# Patient Record
Sex: FEMALE | Race: BLACK OR AFRICAN AMERICAN | NOT HISPANIC OR LATINO | URBAN - METROPOLITAN AREA
[De-identification: names, ages, dates, MRNs, and addresses within clinical notes are randomized per-mention and may not be internally consistent; named-entity substitution may affect disease eponyms.]

---
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
## 2020-03-04 NOTE — ED PROVIDER NOTE - CARDIAC, MLM
NOTIFICATION OF RETURN TO WORK / SCHOOL 
 
3/4/2020 Mr. Lisandro Hale [place] [id number] [facility] [address] To Whom It May Concern: 
 
Kate Marrufo was under the care of [address] [place] [address] [facility]  from 02/21/2020 to 03/04/2020. He will return to work on 03/09/2020 with restrictions of not lifting more than 10 lbs until April 6, 2020 If there are questions or concerns please have the patient contact our office. Sincerely, Sherly Rivera NP 

Normal rate, regular rhythm.  Heart sounds S1, S2.

## 2020-03-18 ENCOUNTER — APPOINTMENT (OUTPATIENT)
Dept: PEDIATRIC ADOLESCENT MEDICINE | Facility: CLINIC | Age: 20
End: 2020-03-18
Payer: SELF-PAY

## 2020-03-18 PROCEDURE — 86580 TB INTRADERMAL TEST: CPT

## 2020-03-18 NOTE — HISTORY OF PRESENT ILLNESS
[PPD] : PPD [FreeTextEntry1] : Pt requesting for PPD plant for  employment.\par Forms completed, in Anastasiya ELAINE desk for reading\par \par PPD plant on right forearm\par FU in 48-72hrs for reading

## 2020-03-20 ENCOUNTER — APPOINTMENT (OUTPATIENT)
Dept: PEDIATRIC ADOLESCENT MEDICINE | Facility: CLINIC | Age: 20
End: 2020-03-20

## 2020-05-05 ENCOUNTER — APPOINTMENT (OUTPATIENT)
Dept: PEDIATRIC ADOLESCENT MEDICINE | Facility: HOSPITAL | Age: 20
End: 2020-05-05
Payer: SELF-PAY

## 2020-05-05 VITALS — HEART RATE: 112 BPM | DIASTOLIC BLOOD PRESSURE: 73 MMHG | WEIGHT: 168 LBS | SYSTOLIC BLOOD PRESSURE: 137 MMHG

## 2020-05-05 PROCEDURE — 99214 OFFICE O/P EST MOD 30 MIN: CPT

## 2020-05-07 LAB
C TRACH RRNA SPEC QL NAA+PROBE: NOT DETECTED
N GONORRHOEA RRNA SPEC QL NAA+PROBE: NOT DETECTED
SOURCE AMPLIFICATION: NORMAL

## 2020-05-07 NOTE — HISTORY OF PRESENT ILLNESS
[FreeTextEntry6] : 18 y/o F with no sig pmh here for vaginal discharge that started 3 days ago.  Patient says she also has itchiness.  She denies fevers or dysuria.  She has no sick contacts and has has been feeling otherwise well.  She was treated for yeast vaginitis in December and she says this feels the same.  She was last sexually active 5/3/20, used "pull out method", she recently became sexually active with first partner.  They have had sex 6 times and use condoms sometimes she says.  LMP 4/12/20.

## 2020-07-13 ENCOUNTER — APPOINTMENT (OUTPATIENT)
Dept: PEDIATRIC ADOLESCENT MEDICINE | Facility: HOSPITAL | Age: 20
End: 2020-07-13
Payer: SELF-PAY

## 2020-07-13 ENCOUNTER — OUTPATIENT (OUTPATIENT)
Dept: OUTPATIENT SERVICES | Age: 20
LOS: 1 days | End: 2020-07-13

## 2020-07-13 VITALS — SYSTOLIC BLOOD PRESSURE: 131 MMHG | WEIGHT: 170 LBS | DIASTOLIC BLOOD PRESSURE: 64 MMHG | HEART RATE: 100 BPM

## 2020-07-13 DIAGNOSIS — N89.8 OTHER SPECIFIED NONINFLAMMATORY DISORDERS OF VAGINA: ICD-10-CM

## 2020-07-13 PROCEDURE — 99213 OFFICE O/P EST LOW 20 MIN: CPT

## 2020-07-13 RX ORDER — KETOCONAZOLE 20.5 MG/ML
2 SHAMPOO, SUSPENSION TOPICAL
Qty: 1 | Refills: 1 | Status: DISCONTINUED | COMMUNITY
Start: 2017-10-06 | End: 2020-07-13

## 2020-07-13 RX ORDER — CHLORHEXIDINE GLUCONATE 4 %
325 (65 FE) LIQUID (ML) TOPICAL TWICE DAILY
Qty: 60 | Refills: 5 | Status: DISCONTINUED | COMMUNITY
Start: 2017-11-09 | End: 2020-07-13

## 2020-07-13 RX ORDER — NYSTATIN 100000 U/G
100000 OINTMENT TOPICAL
Qty: 1 | Refills: 3 | Status: DISCONTINUED | COMMUNITY
Start: 2019-12-10 | End: 2020-07-13

## 2020-07-13 RX ORDER — FLUCONAZOLE 150 MG/1
150 TABLET ORAL
Qty: 1 | Refills: 0 | Status: COMPLETED | COMMUNITY
Start: 2020-07-13 | End: 2020-07-14

## 2020-07-13 RX ORDER — FLUCONAZOLE 150 MG/1
150 TABLET ORAL
Qty: 1 | Refills: 0 | Status: DISCONTINUED | COMMUNITY
Start: 2019-12-10 | End: 2020-07-13

## 2020-07-13 NOTE — REVIEW OF SYSTEMS
[Vaginal Dischage] : vaginal discharge [Vaginal Itch] : vaginal itch [Negative] : Skin [Dysuria] : no dysuria [Polyuria] : no polyuria [Hematuria] : no hematuria

## 2020-07-13 NOTE — DISCUSSION/SUMMARY
[FreeTextEntry1] : 18yo female tolerated vaginal swab for diagnosis of likely candida infection\par BD Affirm Vaginosis panel obtained but patient requesting treatment prior to obtaining results\par Fluconazole 150mg x 1\par GC/CT vaginal swab obtained

## 2020-07-13 NOTE — RISK ASSESSMENT
[Has had sexual intercourse] : has had sexual intercourse [Vaginal] : vaginal [Uses tobacco] : does not use tobacco [Drinks alcohol] : does not drink alcohol [Uses drugs] : does not use drugs  [Has problems with sleep] : does not have problems with sleep [Gets depressed, anxious, or irritable/has mood swings] : does not get depressed, anxious, or irritable/has mood swings [Has thought about hurting self or considered suicide] : has not thought about hurting self or considered suicide [de-identified] : lives with mother [de-identified] : attends [employer] which will be remote come September as Jaden; studying nursing or

## 2020-07-13 NOTE — HISTORY OF PRESENT ILLNESS
[FreeTextEntry6] : Patient is 18yo female seen for 3 d history of vaginal irritation but intermittent since May 2020\par Treated w/Diflucan 12/19\par last unprotected sex 6/28 w/18yo partner of 7 months; no new partners\par LMP 6/15\par \par No dysuria or hematuria, +white cottage cheese like discharge, \par does not want hormonal birth control at this time

## 2020-07-13 NOTE — PHYSICAL EXAM
[Normal External Genitalia] : normal external genitalia [NL] : no acute distress, alert [Vaginal Discharge] : vaginal discharge [FreeTextEntry6] : very slight whitish dischg at outer vulva; speculum not used; patient tolerated the vaginal swab x 2 with difficulty due to pain/discomfort

## 2020-07-17 LAB
C TRACH RRNA SPEC QL NAA+PROBE: NOT DETECTED
CANDIDA VAG CYTO: DETECTED
G VAGINALIS+PREV SP MTYP VAG QL MICRO: NOT DETECTED
N GONORRHOEA RRNA SPEC QL NAA+PROBE: NOT DETECTED
SOURCE AMPLIFICATION: NORMAL
T VAGINALIS VAG QL WET PREP: NOT DETECTED

## 2020-09-25 ENCOUNTER — RX RENEWAL (OUTPATIENT)
Age: 20
End: 2020-09-25

## 2020-11-13 NOTE — ED PROVIDER NOTE - CHPI ED SYMPTOMS POS
Repeat TTE with normal EF and concentric remodeling.  -Continue to treat per Pulmonology recommendations  -Monitor I's and O's   HEADACHE

## 2020-11-19 NOTE — ED PROVIDER NOTE - ATTENDING CONTRIBUTION TO CARE
The resident's documentation has been prepared under my direction and personally reviewed by me in its entirety. I confirm that the note above accurately reflects all work, treatment, procedures, and medical decision making performed by me.  Jorge Chamberlain MD
left shoulder dressing in place

## 2020-11-20 NOTE — ED PEDIATRIC NURSE NOTE - DOES PATIENT HAVE ADVANCE DIRECTIVE
CHEST 2 VIEWS 



INDICATION / CLINICAL INFORMATION:

Chest Pain.



COMPARISON: 

3/6/2020



FINDINGS:



SUPPORT DEVICES: None.

HEART / MEDIASTINUM: Cardiac silhouette is moderately enlarged, new since prior study with normal pul
monary vascularity 

LUNGS / PLEURA: No significant pulmonary or pleural abnormality. No pneumothorax. 



ADDITIONAL FINDINGS: No significant additional findings.



IMPRESSION:

1. New onset enlarged cardiac silhouette with globular configuration may be secondary to pericardial 
effusion. Echocardiogram recommended.



Signer Name: Ata Leal MD 

Signed: 11/20/2020 8:10 PM

Workstation Name: [id number]

## 2021-04-01 NOTE — ED PEDIATRIC TRIAGE NOTE - RESPIRATORY RATE (BREATHS/MIN)
Attempted to reach patient. Spoke with wife, he was sleeping. Provided her my direct extension for call back when he wakes up.  
Patient returns call. He would like to return to work 04/02/21. He understands he will be on light duty with wearing the brace @ all times. Requested letter in [employer]. Sent.  
18

## 2021-06-15 ENCOUNTER — OUTPATIENT (OUTPATIENT)
Dept: OUTPATIENT SERVICES | Age: 21
LOS: 1 days | End: 2021-06-15

## 2021-06-15 ENCOUNTER — APPOINTMENT (OUTPATIENT)
Dept: PEDIATRIC ADOLESCENT MEDICINE | Facility: HOSPITAL | Age: 21
End: 2021-06-15
Payer: MEDICAID

## 2021-06-15 VITALS
BODY MASS INDEX: 33.68 KG/M2 | HEART RATE: 81 BPM | HEIGHT: 62 IN | DIASTOLIC BLOOD PRESSURE: 60 MMHG | WEIGHT: 183 LBS | SYSTOLIC BLOOD PRESSURE: 134 MMHG

## 2021-06-15 DIAGNOSIS — Z87.42 PERSONAL HISTORY OF OTHER DISEASES OF THE FEMALE GENITAL TRACT: ICD-10-CM

## 2021-06-15 DIAGNOSIS — B36.0 PITYRIASIS VERSICOLOR: ICD-10-CM

## 2021-06-15 DIAGNOSIS — Z23 ENCOUNTER FOR IMMUNIZATION: ICD-10-CM

## 2021-06-15 PROCEDURE — 81025 URINE PREGNANCY TEST: CPT

## 2021-06-15 PROCEDURE — 99395 PREV VISIT EST AGE 18-39: CPT | Mod: 25

## 2021-06-15 NOTE — HISTORY OF PRESENT ILLNESS
[Needs Immunizations] : needs immunizations [Eats meals with family] : eats meals with family [Drinks alcohol] : drinks alcohol [No] : No cigarette smoke exposure [Uses safety belts/safety equipment] : uses safety belts/safety equipment  [Has peer relationships free of violence] : has peer relationships free of violence [Yes] : Patient has had sexual intercourse. [Has ways to cope with stress] : has ways to cope with stress [Displays self-confidence] : displays self-confidence [Gets depressed, anxious, or irritable/has mood swings] : gets depressed, anxious, or irritable/has mood swings [With Teen] : teen [Uses electronic nicotine delivery system] : does not use electronic nicotine delivery system [Exposure to electronic nicotine delivery system] : no exposure to electronic nicotine delivery system [Uses tobacco] : does not use tobacco [Exposure to tobacco] : no exposure to tobacco [Uses drugs] : does not use drugs  [Exposure to drugs] : no exposure to drugs [Exposure to alcohol] : no exposure to alcohol [Has problems with sleep] : does not have problems with sleep [Has thought about hurting self or considered suicide] : has not thought about hurting self or considered suicide [de-identified] : facial hair  [de-identified] : declined today Tdap  [de-identified] : lives with mother  [de-identified] : not going to school since COVID [de-identified] : eating out a lot  [de-identified] : works as a  at [employer]  [FreeTextEntry1] : Karen is a 19yo F with anxiety, EIA, obesity here for annual PE. \par \par Since last PE, denies ED visits, hospitalizations, COVID exposure/symptoms, travel outside [facility]/[place].\par Patient contributes her weight gain to lack of activity, eating takeout food, feeling depressed about her brother's recent death. Her older brother was shot 4 months ago and has been sad and grieving. Denies active and passive SI. \par Patient concerned that she noticed facial hair few months ago and started to shave her chin.\par Patient requesting letter stating heat and physical physical exacerbates her asthma and requesting a letter stating to return to work in the fall. She has not been at work the past 2 weeks as a  \par \par LMP: today day#4  \par Denies history of migraines with auras\par Current partners: 1 male x 3 years \par Last sexual activity:  6/4/2021 unprotected \par STI history: denies \par Denies any painful urination, vaginal discharge/odor or lesions/mass\par \par \par

## 2021-06-15 NOTE — DISCUSSION/SUMMARY
[Met privately with the adolescent for part of the office visit?] : Met privately with the adolescent for part of the office visit? Yes [Adolescent demonstrates understanding of his/her conditions and how to take prescribed medications?] : Adolescent demonstrates understanding of his/her conditions and how to take prescribed medications? Yes [Adolescent is competent in independently making appointments, filling prescriptions, following up on referrals, and seeking emergency services, as needed?] : Adolescent is competent in independently making appointments, filling prescriptions, following up on referrals, and seeking emergency services, as needed? Yes [Adolescent asks questions during each office  visit and participates in the care plan?] : Adolescent asks questions during each office visit and participates in the care plan? Yes [Adolescent's caregivers were provided with the opportunity to discuss their concerns about transferring decision making responsibility to the adolescent?] : Adolescent's caregivers were provided with the opportunity to discuss their concerns about transferring decision making responsibility to the adolescent? Yes [FreeTextEntry1] : Karen is a 21yo F with anxiety, EIA, obesity, eczema here for annual PE. \par \par POCT UCG neg\par BMI 33.4, gained 13lbs the past year\par \par Plan:\par - Triamcinolone 0.1% ointment \par - Eczema care: cut nails short, wash skin in tepid water, avoid over washing to dry out skin, keep skin hydrated, cool mist humidifier, cotton clothing and apply lotion while skin is wet after bath. Cool compress to irritated area. Recommend fragrance free sensitive skin hygiene products, lotion and detergents: Aveeno, CeraVe or Cetaphil.\par - Discussed 5-2-1-0, healthier food choices, avoid sugar drinks, fast food or take out, increase physical activity/limit sedentary lifestyle and screen time\par - Lab: HgbA1c, lipid panel, CBC, GC/CT. Patient refuse "needles" today\par - List of MH agencies provided. Patient declined on starting therapy with Zoraida Romero\par - FU 1 month for labs and Tdap

## 2021-06-16 ENCOUNTER — RESULT CHARGE (OUTPATIENT)
Age: 21
End: 2021-06-16

## 2021-06-16 LAB
HCG UR QL: NEGATIVE
QUALITY CONTROL: YES

## 2021-06-29 ENCOUNTER — APPOINTMENT (OUTPATIENT)
Dept: PEDIATRICS | Facility: HOSPITAL | Age: 21
End: 2021-06-29

## 2021-06-29 ENCOUNTER — NON-APPOINTMENT (OUTPATIENT)
Age: 21
End: 2021-06-29

## 2021-06-30 NOTE — ED PEDIATRIC NURSE NOTE - FINAL NURSING ELECTRONIC SIGNATURE
Is This A New Presentation, Or A Follow-Up?: Follow Up Telangiectasia Additional History: Pt here for [facility]. 24-Feb-2018 14:29

## 2021-07-13 ENCOUNTER — APPOINTMENT (OUTPATIENT)
Dept: PEDIATRIC ADOLESCENT MEDICINE | Facility: HOSPITAL | Age: 21
End: 2021-07-13

## 2022-02-07 ENCOUNTER — APPOINTMENT (OUTPATIENT)
Dept: PEDIATRIC ADOLESCENT MEDICINE | Facility: HOSPITAL | Age: 22
End: 2022-02-07
Payer: COMMERCIAL

## 2022-02-07 VITALS
DIASTOLIC BLOOD PRESSURE: 61 MMHG | HEART RATE: 81 BPM | WEIGHT: 192.5 LBS | TEMPERATURE: 98.9 F | SYSTOLIC BLOOD PRESSURE: 134 MMHG

## 2022-02-07 DIAGNOSIS — M54.9 DORSALGIA, UNSPECIFIED: ICD-10-CM

## 2022-02-07 PROCEDURE — 99214 OFFICE O/P EST MOD 30 MIN: CPT

## 2022-02-07 NOTE — PHYSICAL EXAM
[NL] : no acute distress, alert [FreeTextEntry4] : right nares clustered vesicles with crusting and erythema

## 2022-02-07 NOTE — HISTORY OF PRESENT ILLNESS
[FreeTextEntry6] : Karen is a 22yo F with hx obesity here for sick visit.\par \par Patient report blisters in her nares, itchy, tingling irritated feeling and picking it the past 2 days \par She usually get these blisters when she is getting sick or getting her period.\par Tried a candle home remedy and applied AD ointment \par LMP: 1/17/2022, regular\par \par #2\par Worsening of mid back pain and was inquiring about breast reduction. She has tried to monitor her weight but her weight has been going up and down. 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 She feels it's due to skipping breakfast and sometimes lunch then gets very hungry by end of the day and eats a lot.

## 2022-02-07 NOTE — DISCUSSION/SUMMARY
[FreeTextEntry1] : Karen is a 22yo F with hx obesity here for impetigo. \par \par \par Plan:\par - Bactroban BID until resolves. Avoid scratching and spreading infection, good handwashing\par - Refer to weight management \par - Goal: start daily exercise routine, limit CHO intake and avoid eating large portions later in the day\par - Recommend psychotherapy concurrently with this program due to history of behavioral concerns\par - Discussed 5-2-1-0, healthier food choices, avoid sugar drinks, fast food or take out, increase physical activity/limit sedentary lifestyle\par - Patient declined fasting labs today: HgbA1c, lipid panel, HFP, TSH, FT4 and Tdap booster due to not having her mother present for support. \par - FU for annual PE for Tdap and fasting labs\par \par \par

## 2022-02-07 NOTE — BEGINNING OF VISIT
[Patient] : patient Spine appears normal, range of motion is not limited, no muscle or joint tenderness

## 2022-06-24 ENCOUNTER — APPOINTMENT (OUTPATIENT)
Dept: PEDIATRIC ADOLESCENT MEDICINE | Facility: HOSPITAL | Age: 22
End: 2022-06-24

## 2022-06-24 DIAGNOSIS — F40.298 OTHER SPECIFIED PHOBIA: ICD-10-CM

## 2022-07-13 ENCOUNTER — APPOINTMENT (OUTPATIENT)
Dept: PEDIATRIC ADOLESCENT MEDICINE | Facility: HOSPITAL | Age: 22
End: 2022-07-13

## 2022-07-20 ENCOUNTER — APPOINTMENT (OUTPATIENT)
Dept: PEDIATRIC ADOLESCENT MEDICINE | Facility: HOSPITAL | Age: 22
End: 2022-07-20

## 2022-07-20 ENCOUNTER — OUTPATIENT (OUTPATIENT)
Dept: OUTPATIENT SERVICES | Age: 22
LOS: 1 days | End: 2022-07-20

## 2022-07-20 VITALS
SYSTOLIC BLOOD PRESSURE: 131 MMHG | DIASTOLIC BLOOD PRESSURE: 79 MMHG | HEART RATE: 96 BPM | WEIGHT: 203.5 LBS | HEIGHT: 62.5 IN | BODY MASS INDEX: 36.51 KG/M2

## 2022-07-20 DIAGNOSIS — Z11.1 ENCOUNTER FOR SCREENING FOR RESPIRATORY TUBERCULOSIS: ICD-10-CM

## 2022-07-20 DIAGNOSIS — Z00.00 ENCOUNTER FOR GENERAL ADULT MEDICAL EXAMINATION WITHOUT ABNORMAL FINDINGS: ICD-10-CM

## 2022-07-20 DIAGNOSIS — Z87.2 PERSONAL HISTORY OF DISEASES OF THE SKIN AND SUBCUTANEOUS TISSUE: ICD-10-CM

## 2022-07-20 DIAGNOSIS — F41.9 ANXIETY DISORDER, UNSPECIFIED: ICD-10-CM

## 2022-07-20 DIAGNOSIS — Z76.89 PERSONS ENCOUNTERING HEALTH SERVICES IN OTHER SPECIFIED CIRCUMSTANCES: ICD-10-CM

## 2022-07-20 DIAGNOSIS — Z23 ENCOUNTER FOR IMMUNIZATION: ICD-10-CM

## 2022-07-20 DIAGNOSIS — I10 ESSENTIAL (PRIMARY) HYPERTENSION: ICD-10-CM

## 2022-07-20 DIAGNOSIS — E66.9 OBESITY, UNSPECIFIED: ICD-10-CM

## 2022-07-20 DIAGNOSIS — J30.2 OTHER SEASONAL ALLERGIC RHINITIS: ICD-10-CM

## 2022-07-20 PROCEDURE — 81025 URINE PREGNANCY TEST: CPT

## 2022-07-20 PROCEDURE — 90715 TDAP VACCINE 7 YRS/> IM: CPT

## 2022-07-20 PROCEDURE — 90472 IMMUNIZATION ADMIN EACH ADD: CPT | Mod: NC

## 2022-07-20 PROCEDURE — 90632 HEPA VACCINE ADULT IM: CPT

## 2022-07-20 PROCEDURE — 99395 PREV VISIT EST AGE 18-39: CPT | Mod: 25

## 2022-07-20 PROCEDURE — 90620 MENB-4C VACCINE IM: CPT

## 2022-07-20 PROCEDURE — 81003 URINALYSIS AUTO W/O SCOPE: CPT | Mod: QW

## 2022-07-20 PROCEDURE — 90471 IMMUNIZATION ADMIN: CPT | Mod: NC

## 2022-07-20 RX ORDER — EPINEPHRINE 0.3 MG/.3ML
0.3 INJECTION INTRAMUSCULAR
Qty: 2 | Refills: 0 | Status: DISCONTINUED | COMMUNITY
Start: 2017-10-14 | End: 2022-07-20

## 2022-07-20 RX ORDER — MUPIROCIN 20 MG/G
2 OINTMENT TOPICAL TWICE DAILY
Qty: 1 | Refills: 0 | Status: DISCONTINUED | COMMUNITY
Start: 2022-02-07 | End: 2022-07-20

## 2022-07-20 RX ORDER — CETIRIZINE HYDROCHLORIDE 10 MG/1
10 TABLET, COATED ORAL
Qty: 1 | Refills: 1 | Status: DISCONTINUED | COMMUNITY
Start: 2021-06-15 | End: 2022-07-20

## 2022-07-20 NOTE — DISCUSSION/SUMMARY
[FreeTextEntry1] : Karen is a 20yo F with hx obesity, hypertension, anxiety, depression, EIA, eczema here for annual PE. \par \par POCT UCG neg\par POCT UA: GLU neg, KET neg, SG 1.010, PRO neg, NIT neg, JOHN PAUL neg\par \par Plan:\par - Lab: CBC, lipid, A1c, HIV, syphilis, GC/CT \par - Vaccine: Tdap, HAV#1, Bexero#1\par - Goal: daily exercise routine 20-30mins, eliminate all sweet drinks (Gatorade, juices), limit eating take out, sodium intake and CHO  \par - Recommend psychotherapy concurrently with this program due to history of BH or MH concerns\par - Discussed 5-2-1-0, healthier food choices, avoid sugar drinks, fast food or take out, increase physical activity/limit sedentary lifestyle and screen time\par  - Discussed in length: health risks in obesity, high blood pressure, high cholesterol, risk factors for cardiovascular disease, increased risk of impaired glucose tolerance, insulin resistance, type 2 diabetes, breathing problems, such as asthma and sleep apnea, joint problems, musculoskeletal discomfort, fatty liver disease, gallstones, GERD/heartburn, anxiety, depression, low self-esteem \par - Followup with nutritionist [facility] [facility], food log, questionnaire and consent provided and request to email back before appointment \par - Due for routine dental care \par - Due for routine pap\par - Discussed transition to adult care \par

## 2022-07-20 NOTE — HISTORY OF PRESENT ILLNESS
[Yes] : Patient has had sexual intercourse. [Eats regular meals including adequate fruits and vegetables] : does not eat regular meals including adequate fruits and vegetables [At least 1 hour of physical activity a day] : does not do at least 1 hour of physical activity a day [Screen time (except homework) less than 2 hours a day] : no screen time (except homework) less than 2 hours a day [Uses electronic nicotine delivery system] : does not use electronic nicotine delivery system [Exposure to electronic nicotine delivery system] : no exposure to electronic nicotine delivery system [Uses tobacco] : does not use tobacco [Exposure to tobacco] : no exposure to tobacco [Uses drugs] : does not use drugs  [Exposure to drugs] : no exposure to drugs [Exposure to alcohol] : no exposure to alcohol [Impaired/distracted driving] : no impaired/distracted driving [Has problems with sleep] : does not have problems with sleep [Has thought about hurting self or considered suicide] : has not thought about hurting self or considered suicide [de-identified] : Tdap, Bexero#1, HAV#1  [de-identified] : lives with mother [de-identified] : not attending school   [de-identified] : eat take out and eats out almost daily  [de-identified] : working from home, does not have any physical activity  [de-identified] : social drinking  [FreeTextEntry1] : Karen is a 20yo F with hx obesity, hypertension, anxiety, depression, EIA, eczema here for annual PE. \par \par Since last PE, denies ED visits, hospitalizations, COVID exposure/symptoms, travel outside [facility]/[place].\par COVID + 3/2022 sick for 2 days mild symptom. Patient is not vaccinated for COVID \par Attending therapy the past 3 months, weekly for anxiety, depression and grieving for lost of brother last year  \par Patient is upset about today's weight. Admits to eating take out, eating out and has no physical activity. She does not know why she is gaining weight when she eats 1-2 times/day. Admits to no physical activity. Works from home.                                          \par \par LMP: today day#2, regular \par Denies history of migraines with auras\par Current partners: 1 male x 3 years \par Last sexual activity: few months \par STI history: denies \par Denies any painful urination, vaginal discharge/odor or lesions/mass\par \par \par

## 2022-07-22 LAB
BASOPHILS # BLD AUTO: 0.02 K/UL
BASOPHILS NFR BLD AUTO: 0.3 %
C TRACH RRNA SPEC QL NAA+PROBE: NOT DETECTED
CHOLEST SERPL-MCNC: 148 MG/DL
EOSINOPHIL # BLD AUTO: 0.02 K/UL
EOSINOPHIL NFR BLD AUTO: 0.3 %
ESTIMATED AVERAGE GLUCOSE: 111 MG/DL
HBA1C MFR BLD HPLC: 5.5 %
HCT VFR BLD CALC: 35.9 %
HDLC SERPL-MCNC: 48 MG/DL
HGB BLD-MCNC: 11.5 G/DL
HIV1+2 AB SPEC QL IA.RAPID: NONREACTIVE
IMM GRANULOCYTES NFR BLD AUTO: 0.3 %
LDLC SERPL CALC-MCNC: 84 MG/DL
LYMPHOCYTES # BLD AUTO: 1.7 K/UL
LYMPHOCYTES NFR BLD AUTO: 26.2 %
MAN DIFF?: NORMAL
MCHC RBC-ENTMCNC: 21.7 PG
MCHC RBC-ENTMCNC: 32 GM/DL
MCV RBC AUTO: 67.6 FL
MONOCYTES # BLD AUTO: 0.45 K/UL
MONOCYTES NFR BLD AUTO: 6.9 %
N GONORRHOEA RRNA SPEC QL NAA+PROBE: NOT DETECTED
NEUTROPHILS # BLD AUTO: 4.29 K/UL
NEUTROPHILS NFR BLD AUTO: 66 %
NONHDLC SERPL-MCNC: 100 MG/DL
PLATELET # BLD AUTO: 397 K/UL
RBC # BLD: 5.31 M/UL
RBC # FLD: 15.2 %
SOURCE AMPLIFICATION: NORMAL
T PALLIDUM AB SER QL IA: NEGATIVE
TRIGL SERPL-MCNC: 82 MG/DL
WBC # FLD AUTO: 6.5 K/UL

## 2022-08-03 ENCOUNTER — APPOINTMENT (OUTPATIENT)
Dept: PEDIATRIC ADOLESCENT MEDICINE | Facility: CLINIC | Age: 22
End: 2022-08-03

## 2022-08-12 NOTE — ED PROVIDER NOTE - CONSTITUTIONAL [-], MLM
Patient called with questions about labs, she was disconnected.  RN called back and she asked if she needed to get her labs done today or with COVID test on 8/17. RN said response labs today. Patient stated she HELD her asa and plavix this am and she is going to the lab now.   no fever

## 2022-11-01 ENCOUNTER — APPOINTMENT (OUTPATIENT)
Dept: PEDIATRIC ADOLESCENT MEDICINE | Facility: HOSPITAL | Age: 22
End: 2022-11-01
Payer: COMMERCIAL

## 2022-11-01 ENCOUNTER — OUTPATIENT (OUTPATIENT)
Dept: OUTPATIENT SERVICES | Age: 22
LOS: 1 days | End: 2022-11-01

## 2022-11-01 VITALS — DIASTOLIC BLOOD PRESSURE: 67 MMHG | SYSTOLIC BLOOD PRESSURE: 120 MMHG | WEIGHT: 204.5 LBS | HEART RATE: 77 BPM

## 2022-11-01 DIAGNOSIS — N89.8 OTHER SPECIFIED NONINFLAMMATORY DISORDERS OF VAGINA: ICD-10-CM

## 2022-11-01 PROCEDURE — 81025 URINE PREGNANCY TEST: CPT

## 2022-11-01 PROCEDURE — 99214 OFFICE O/P EST MOD 30 MIN: CPT | Mod: 25

## 2022-11-02 LAB
C TRACH RRNA SPEC QL NAA+PROBE: NOT DETECTED
CANDIDA VAG CYTO: NOT DETECTED
G VAGINALIS+PREV SP MTYP VAG QL MICRO: NOT DETECTED
N GONORRHOEA RRNA SPEC QL NAA+PROBE: NOT DETECTED
SOURCE AMPLIFICATION: NORMAL
T VAGINALIS VAG QL WET PREP: NOT DETECTED

## 2022-11-04 NOTE — HISTORY OF PRESENT ILLNESS
[FreeTextEntry6] : Karen is a 20yo F with hx obesity, hypertension, anxiety, depression, EIA, eczema here for sick visit.\par \par Mother report 1 week ago used OTC feminine wash then started itchy rash on labia, vaginal discharge/odor started  and patient used "honey pot" feminine wash however still itching but no discharge or odor\par LMP: 10/22/2022 a week ago, regular cycle, lasting 7 days \par Current partners: 1 male for 3-4 years, on and off \par Last sexual activity:  months ago \par STI history: denies \par Denies any painful urination. \par \par \par

## 2022-11-04 NOTE — DISCUSSION/SUMMARY
[FreeTextEntry1] : Karen is a 20yo F with hx obesity, hypertension, anxiety, depression, EIA, eczema here for vaginal itching. \par \par POCT UCG neg\par \par Plan:\par - Patient declined initial pap smear today. Offered to have pap done in our office before aging out or to be referred to GYN adult care\par - Lab: BV panel. GC/CT \par - Discouraged any feminine wash/irritants/shaving to vaginal area\par - Diflucan 150mg PO x 1 today then in 3 days\par - Apply topical miconazole cream TID

## 2022-11-04 NOTE — PHYSICAL EXAM
[NL] : no acute distress, alert [Erythematous labia minora] : erythematous labia minora [Erythematous labia majora] : erythematous labia majora [Excoriations in perineum] : excoriations in perineum [Vaginal discharge] : no vaginal discharge [FreeTextEntry6] : excoriation on labia minora-introitus area

## 2022-11-08 ENCOUNTER — APPOINTMENT (OUTPATIENT)
Dept: PEDIATRIC ADOLESCENT MEDICINE | Facility: CLINIC | Age: 22
End: 2022-11-08

## 2022-11-09 DIAGNOSIS — N89.8 OTHER SPECIFIED NONINFLAMMATORY DISORDERS OF VAGINA: ICD-10-CM

## 2023-02-06 ENCOUNTER — APPOINTMENT (OUTPATIENT)
Dept: INTERNAL MEDICINE | Facility: CLINIC | Age: 23
End: 2023-02-06

## 2023-02-09 ENCOUNTER — APPOINTMENT (OUTPATIENT)
Dept: INTERNAL MEDICINE | Facility: CLINIC | Age: 23
End: 2023-02-09

## 2023-02-09 ENCOUNTER — OUTPATIENT (OUTPATIENT)
Dept: OUTPATIENT SERVICES | Facility: HOSPITAL | Age: 23
LOS: 1 days | End: 2023-02-09

## 2023-02-09 ENCOUNTER — LABORATORY RESULT (OUTPATIENT)
Age: 23
End: 2023-02-09

## 2023-02-09 ENCOUNTER — APPOINTMENT (OUTPATIENT)
Dept: INTERNAL MEDICINE | Facility: CLINIC | Age: 23
End: 2023-02-09
Payer: COMMERCIAL

## 2023-02-09 VITALS
HEART RATE: 82 BPM | SYSTOLIC BLOOD PRESSURE: 132 MMHG | HEIGHT: 62.2 IN | BODY MASS INDEX: 38.15 KG/M2 | DIASTOLIC BLOOD PRESSURE: 72 MMHG | WEIGHT: 210 LBS | OXYGEN SATURATION: 99 %

## 2023-02-09 DIAGNOSIS — Z83.2 FAMILY HISTORY OF DISEASES OF THE BLOOD AND BLOOD-FORMING ORGANS AND CERTAIN DISORDERS INVOLVING THE IMMUNE MECHANISM: ICD-10-CM

## 2023-02-09 DIAGNOSIS — E66.01 MORBID (SEVERE) OBESITY DUE TO EXCESS CALORIES: ICD-10-CM

## 2023-02-09 DIAGNOSIS — Z83.3 FAMILY HISTORY OF DIABETES MELLITUS: ICD-10-CM

## 2023-02-09 DIAGNOSIS — N92.6 IRREGULAR MENSTRUATION, UNSPECIFIED: ICD-10-CM

## 2023-02-09 DIAGNOSIS — R14.3 FLATULENCE: ICD-10-CM

## 2023-02-09 LAB
HCG UR QL: NEGATIVE
QUALITY CONTROL: YES

## 2023-02-09 PROCEDURE — 99385 PREV VISIT NEW AGE 18-39: CPT

## 2023-02-09 RX ORDER — MICONAZOLE NITRATE 20 MG/G
2 CREAM VAGINAL
Qty: 1 | Refills: 0 | Status: DISCONTINUED | COMMUNITY
Start: 2022-11-01 | End: 2023-02-09

## 2023-02-09 RX ORDER — FLUCONAZOLE 150 MG/1
150 TABLET ORAL
Qty: 2 | Refills: 0 | Status: DISCONTINUED | COMMUNITY
Start: 2022-11-01 | End: 2023-02-09

## 2023-02-09 NOTE — HISTORY OF PRESENT ILLNESS
[FreeTextEntry1] : establish care  [de-identified] : \par Patient is a 22 year old F with hx of anxiety/depression, asthma coming in to [facility] care. \par \par Patient is concerned about increased frequency of BM and increased gas after recent dietary change. Stools are normal brown, no diarrhea or constipation. Pt also endorses feeling more gas but no abdominal pain or reflux.  Has been having it for the last week. Has been trying to eat less junk food and eating 3 meals with snacking instead of 2 meals.  Diet: B- cereal or omelette or pancake/eggs/ Turkey love; L- pasta/salad/sandwich; D- chicken/rice/vegetables. Snacks- chewy bars. \par \par Has been having irregular periods recently. Patient is sexually active, last time was in Dec- normally use condoms but also used plan B in Dec. Reports getting a period after 2 wks from her last.  Last period lasted for 7 days (Jan 27). Not painful. Has not been to GYN before. Pt also concerned that going to the GYM triggered an early period.  \par \par Pt also with increased wt gain. Reports no longer going to GYM as she is afraid it might affect her menstrual cycle. Wants to eat healthier and is interested in wt loss. \par \par Patient reports mood is good at this time. Follows a therapist once a week or once every 2 weeks \par \par Pt reports hx of exercised induced asthma. Uses albuterol very infrequently. Denies any SOB, wheezing, nocturnal awakening due to resp symps or cough. \par \par Pt reports hx of elevated BP but mostly when she is anxious. Endorses HTN is really not an issue.

## 2023-02-09 NOTE — PLAN
[FreeTextEntry1] : \par Patient is a 22 year old F coming in to establish care/ annual\par \par #obesity\par - discussed importance of healthy eating and exercise\par - nutrition referral and WM referral provided \par -advised to avoid added sugars, eat whole foods, avoid processed foods/cerals and to always read the food label \par \par #gas\par - nutrition referral- discuss LOW FODMAP diet\par - will cont to assess next visit\par \par #irregular period\par - POC preg test negative today \par - could be in the setting of using plan B, pt also with more wt gain\par - GYN referral provided for further assessment/ contraception counseling/ pap\par - cont to practice safe sex, advised on consistent condom use \par \par #exercise induced asthma\par -controlled \par - on PRN albuterol \par \par #HCM\par -labs as above, STI testing\par - refused COVID vaccination/flu vaccine \par - up to date with Tdap \par - GYN referral for possible pap\par \par f/u in 6 mo or PRN

## 2023-02-09 NOTE — HEALTH RISK ASSESSMENT
[0] : 2) Feeling down, depressed, or hopeless: Not at all (0) [PHQ-2 Negative - No further assessment needed] : PHQ-2 Negative - No further assessment needed [HIV Test offered] : HIV Test offered [Hepatitis C test offered] : Hepatitis C test offered [With Significant Other] : lives with significant other [Employed] : employed [Fully functional (bathing, dressing, toileting, transferring, walking, feeding)] : Fully functional (bathing, dressing, toileting, transferring, walking, feeding) [Fully functional (using the telephone, shopping, preparing meals, housekeeping, doing laundry, using] : Fully functional and needs no help or supervision to perform IADLs (using the telephone, shopping, preparing meals, housekeeping, doing laundry, using transportation, managing medications and managing finances) [[id number]] : 0 [Sexually Active] : not sexually active [Reports changes in hearing] : Reports no changes in hearing [Reports changes in vision] : Reports no changes in vision [Reports changes in dental health] : Reports no changes in dental health

## 2023-02-09 NOTE — PHYSICAL EXAM
[No Acute Distress] : no acute distress [Normal Sclera/Conjunctiva] : normal sclera/conjunctiva [Supple] : supple [No Respiratory Distress] : no respiratory distress  [No Accessory Muscle Use] : no accessory muscle use [Clear to Auscultation] : lungs were clear to auscultation bilaterally [Normal Rate] : normal rate  [Regular Rhythm] : with a regular rhythm [Normal S1, S2] : normal S1 and S2 [No Edema] : there was no peripheral edema [Soft] : abdomen soft [Non Tender] : non-tender [Non-distended] : non-distended [Normal Bowel Sounds] : normal bowel sounds [No Spinal Tenderness] : no spinal tenderness [Grossly Normal Strength/Tone] : grossly normal strength/tone [No Focal Deficits] : no focal deficits [Normal Affect] : the affect was normal [Normal Insight/Judgement] : insight and judgment were intact

## 2023-02-10 ENCOUNTER — NON-APPOINTMENT (OUTPATIENT)
Age: 23
End: 2023-02-10

## 2023-02-10 DIAGNOSIS — J45.990 EXERCISE INDUCED BRONCHOSPASM: ICD-10-CM

## 2023-02-10 DIAGNOSIS — R14.3 FLATULENCE: ICD-10-CM

## 2023-02-10 DIAGNOSIS — E66.01 MORBID (SEVERE) OBESITY DUE TO EXCESS CALORIES: ICD-10-CM

## 2023-02-10 DIAGNOSIS — Z00.00 ENCOUNTER FOR GENERAL ADULT MEDICAL EXAMINATION WITHOUT ABNORMAL FINDINGS: ICD-10-CM

## 2023-02-10 DIAGNOSIS — Z76.89 PERSONS ENCOUNTERING HEALTH SERVICES IN OTHER SPECIFIED CIRCUMSTANCES: ICD-10-CM

## 2023-02-10 LAB
25(OH)D3 SERPL-MCNC: 24.3 NG/ML
ALBUMIN SERPL ELPH-MCNC: 4.7 G/DL
ALP BLD-CCNC: 86 U/L
ALT SERPL-CCNC: 12 U/L
ANION GAP SERPL CALC-SCNC: 13 MMOL/L
AST SERPL-CCNC: 15 U/L
BASOPHILS # BLD AUTO: 0.03 K/UL
BASOPHILS NFR BLD AUTO: 0.5 %
BILIRUB SERPL-MCNC: 0.3 MG/DL
BUN SERPL-MCNC: 9 MG/DL
C TRACH RRNA SPEC QL NAA+PROBE: NOT DETECTED
CALCIUM SERPL-MCNC: 10.2 MG/DL
CHLORIDE SERPL-SCNC: 101 MMOL/L
CHOLEST SERPL-MCNC: 148 MG/DL
CO2 SERPL-SCNC: 25 MMOL/L
CREAT SERPL-MCNC: 0.86 MG/DL
EGFR: 98 ML/MIN/1.73M2
EOSINOPHIL # BLD AUTO: 0.02 K/UL
EOSINOPHIL NFR BLD AUTO: 0.3 %
ESTIMATED AVERAGE GLUCOSE: 117 MG/DL
GLUCOSE SERPL-MCNC: 89 MG/DL
HBA1C MFR BLD HPLC: 5.7 %
HCT VFR BLD CALC: 38.7 %
HDLC SERPL-MCNC: 50 MG/DL
HGB BLD-MCNC: 12.3 G/DL
IMM GRANULOCYTES NFR BLD AUTO: 0.3 %
LDLC SERPL CALC-MCNC: 86 MG/DL
LYMPHOCYTES # BLD AUTO: 1.39 K/UL
LYMPHOCYTES NFR BLD AUTO: 21.4 %
MAN DIFF?: NORMAL
MCHC RBC-ENTMCNC: 21.2 PG
MCHC RBC-ENTMCNC: 31.8 GM/DL
MCV RBC AUTO: 66.7 FL
MONOCYTES # BLD AUTO: 0.51 K/UL
MONOCYTES NFR BLD AUTO: 7.9 %
N GONORRHOEA RRNA SPEC QL NAA+PROBE: NOT DETECTED
NEUTROPHILS # BLD AUTO: 4.52 K/UL
NEUTROPHILS NFR BLD AUTO: 69.6 %
NONHDLC SERPL-MCNC: 98 MG/DL
PLATELET # BLD AUTO: 372 K/UL
POTASSIUM SERPL-SCNC: 4.4 MMOL/L
PROT SERPL-MCNC: 8.5 G/DL
RBC # BLD: 5.8 M/UL
RBC # FLD: 15 %
SODIUM SERPL-SCNC: 139 MMOL/L
SOURCE AMPLIFICATION: NORMAL
T PALLIDUM AB SER QL IA: NEGATIVE
TRIGL SERPL-MCNC: 62 MG/DL
WBC # FLD AUTO: 6.49 K/UL

## 2023-06-16 ENCOUNTER — APPOINTMENT (OUTPATIENT)
Dept: INTERNAL MEDICINE | Facility: CLINIC | Age: 23
End: 2023-06-16
Payer: COMMERCIAL

## 2023-06-16 ENCOUNTER — OUTPATIENT (OUTPATIENT)
Dept: OUTPATIENT SERVICES | Facility: HOSPITAL | Age: 23
LOS: 1 days | End: 2023-06-16

## 2023-06-16 VITALS
RESPIRATION RATE: 15 BRPM | DIASTOLIC BLOOD PRESSURE: 70 MMHG | HEART RATE: 83 BPM | HEIGHT: 62 IN | WEIGHT: 206 LBS | SYSTOLIC BLOOD PRESSURE: 114 MMHG | OXYGEN SATURATION: 98 % | BODY MASS INDEX: 37.91 KG/M2

## 2023-06-16 DIAGNOSIS — Z02.89 ENCOUNTER FOR OTHER ADMINISTRATIVE EXAMINATIONS: ICD-10-CM

## 2023-06-16 PROCEDURE — 99214 OFFICE O/P EST MOD 30 MIN: CPT

## 2023-06-16 NOTE — PHYSICAL EXAM
[No Acute Distress] : no acute distress [Supple] : supple [No Respiratory Distress] : no respiratory distress  [No Accessory Muscle Use] : no accessory muscle use [Clear to Auscultation] : lungs were clear to auscultation bilaterally [Normal Rate] : normal rate  [Regular Rhythm] : with a regular rhythm [Normal S1, S2] : normal S1 and S2 [No Edema] : there was no peripheral edema [Soft] : abdomen soft [Non Tender] : non-tender [Non-distended] : non-distended [Normal Bowel Sounds] : normal bowel sounds [No Spinal Tenderness] : no spinal tenderness [Grossly Normal Strength/Tone] : grossly normal strength/tone [No Focal Deficits] : no focal deficits [Normal Affect] : the affect was normal [Normal Insight/Judgement] : insight and judgment were intact

## 2023-06-16 NOTE — PLAN
[FreeTextEntry1] : \par Patient is a 22 year old F coming in for form completion\par \par #form completion\par - labs as per form\par -will complete form after lab results \par \par #obesity\par - nutrition referral and [facility] referral provided (advised to attend her [facility] appt later this month) \par \par #gas\par - not endorsing today \par \par #exercise induced asthma\par -controlled \par - on PRN albuterol (refilled)\par \par #HCM\par - refused COVID vaccination/flu vaccine last time \par - up to date with Tdap \par - GYN referral for possible pap provided last time \par \par f/u in 6 mo or PRN

## 2023-06-16 NOTE — HISTORY OF PRESENT ILLNESS
[FreeTextEntry1] : f/u [de-identified] : \par Patient is a 22 year old F with hx of anxiety/depression, asthma coming in for form completion.\par \par Patient endorses feeling well today. Denies any active concern. She is requesting a form to be completed for her jobs.\par \par Endorses that her asthma is well controlled. Used her inhaler twice in the past two weeks mostly due to the bad air quality. Pt is also pening to establish care with [facility] team.

## 2023-06-19 LAB
MEV IGG FLD QL IA: 41.2 AU/ML
MEV IGG+IGM SER-IMP: POSITIVE
MUV AB SER-ACNC: POSITIVE
MUV IGG SER QL IA: 108 AU/ML
RUBV IGG FLD-ACNC: 1.8 INDEX
RUBV IGG SER-IMP: POSITIVE
VZV AB TITR SER: POSITIVE
VZV IGG SER IF-ACNC: 281.4 INDEX

## 2023-06-20 ENCOUNTER — NON-APPOINTMENT (OUTPATIENT)
Age: 23
End: 2023-06-20

## 2023-06-20 DIAGNOSIS — Z02.89 ENCOUNTER FOR OTHER ADMINISTRATIVE EXAMINATIONS: ICD-10-CM

## 2023-06-20 DIAGNOSIS — J45.990 EXERCISE INDUCED BRONCHOSPASM: ICD-10-CM

## 2023-06-20 LAB
M TB IFN-G BLD-IMP: NEGATIVE
QUANTIFERON TB PLUS MITOGEN MINUS NIL: 5.91 IU/ML
QUANTIFERON TB PLUS NIL: 0.02 IU/ML
QUANTIFERON TB PLUS TB1 MINUS NIL: 0 IU/ML
QUANTIFERON TB PLUS TB2 MINUS NIL: 0 IU/ML

## 2023-06-28 ENCOUNTER — APPOINTMENT (OUTPATIENT)
Dept: BARIATRICS/WEIGHT MGMT | Facility: CLINIC | Age: 23
End: 2023-06-28

## 2023-08-03 ENCOUNTER — EMERGENCY (EMERGENCY)
Facility: HOSPITAL | Age: 23
LOS: 1 days | Discharge: HOME | End: 2023-08-03
Admitting: STUDENT IN AN ORGANIZED HEALTH CARE EDUCATION/TRAINING PROGRAM
Payer: COMMERCIAL

## 2023-08-03 VITALS
RESPIRATION RATE: 18 BRPM | SYSTOLIC BLOOD PRESSURE: 132 MMHG | DIASTOLIC BLOOD PRESSURE: 65 MMHG | HEART RATE: 85 BPM | TEMPERATURE: 98 F | OXYGEN SATURATION: 98 %

## 2023-08-03 PROCEDURE — 99283 EMERGENCY DEPT VISIT LOW MDM: CPT

## 2023-08-03 NOTE — ED PROVIDER NOTE - NS ED ROS FT
Gen: no fever and no chills.  HEENT: Ears: no ear pain and no hearing problems. Nose: no nasal congestion and no nasal drainage. Mouth/Throat: no dysphagia, no hoarseness and no throat pain. (+) pruritic bumps to lower lip   Neck: no lumps, no pain, no stiffness and no swollen glands.  Cardiovascular: no chest pain and no edema.  Respiratory: no cough, no wheezing, and no shortness of breath.  Ab: no abdominal pain, no bloating, no constipation, no diarrhea, no nausea and no vomiting  : no dysuria, no frequency, and no hematuria.  MSK: no back pain, no gout, no musculoskeletal pain, no neck pain, and no weakness.  Skin: see above HEENT  Neuro: no loss of consciousness, no gait abnormality, no headache, no sensory deficits, and no weakness.

## 2023-08-03 NOTE — ED PROVIDER NOTE - PATIENT PORTAL LINK FT
You can access the FollowMyHealth Patient Portal offered by [facility] by registering at the following website: [email or web address]. By joining [email or web address]’s [email or web address] portal, you will also be able to view your health information using other applications (apps) compatible with our system.

## 2023-08-03 NOTE — ED PROVIDER NOTE - CLINICAL SUMMARY MEDICAL DECISION MAKING FREE TEXT BOX
23 y/o F no pmh noticed bumps to her lower lip yesterday with pruritis and a little swelling better today. 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   Denies fever, chills, headache, sore throat, cough, CP, SOB, ab pain, n/v/d, fatigue. No rash to her hands and feet. No ulcerations in her mouth. Very well appearing. VSS.  Allergic to berries- does not believe she ate any foods with berries in it.  NKDA  Rec Benadryl as needed, PMD follow up. Strict ED return precautions discussed. Patient understands and agrees.

## 2023-08-03 NOTE — ED PROVIDER NOTE - INTERNATIONAL TRAVEL
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     No current facility-administered medications for this visit.     ALLERGIES:  No Known Allergies    family history includes Diabetes in his father.  Social History     Tobacco Use   • Smoking status: Former Smoker     Packs/day: 0.00   • Smokeless tobacco: Former User   • Tobacco comment: former vape user   Substance Use Topics   • Alcohol use: Not Currently   • Drug use: Never         Review of Systems   Constitutional: Negative for appetite change, chills, fatigue and fever.   HENT: Negative for congestion, ear discharge, ear pain, hearing loss, mouth sores, postnasal drip, rhinorrhea, sinus pressure, sinus pain, sore throat and trouble swallowing.    Eyes: Negative for pain, discharge, redness, itching and visual disturbance.   Respiratory: Negative for cough, shortness of breath and wheezing.    Cardiovascular: Negative for chest pain, palpitations and leg swelling.   Gastrointestinal: Positive for abdominal distention and abdominal pain. Negative for constipation, diarrhea, nausea and vomiting.   Genitourinary: Negative for dysuria, frequency and hematuria.    Musculoskeletal: Positive for back pain. Negative for arthralgias, joint swelling and neck pain.   Skin: Negative for rash and wound.   Allergic/Immunologic: Negative for environmental allergies.   Neurological: Negative for dizziness, weakness, light-headedness, numbness and headaches.   Psychiatric/Behavioral: Negative for sleep disturbance.        Physical Exam  Vitals and nursing note reviewed.   Constitutional:       General: He is not in acute distress.     Appearance: Normal appearance. He is well-developed. He is not ill-appearing or diaphoretic.   HENT:      Head: Normocephalic and atraumatic.      Right Ear: Tympanic membrane, ear canal and external ear normal.      Left Ear: Tympanic membrane, ear canal and external ear normal.      Nose: Nose normal. No congestion or rhinorrhea.      Mouth/Throat:      Mouth: Mucous membranes are moist.      Pharynx: Oropharynx is clear. No oropharyngeal exudate or posterior oropharyngeal erythema.   Eyes:      General:         Right eye: No discharge.         Left eye: No discharge.      Conjunctiva/sclera: Conjunctivae normal.      Pupils: Pupils are equal, round, and reactive to light.   Cardiovascular:      Rate and Rhythm: Normal rate and regular rhythm.      Pulses: Normal pulses.      Heart sounds: Normal heart sounds. No murmur heard.  Pulmonary:      Effort: Pulmonary effort is normal. No respiratory distress.      Breath sounds: Normal breath sounds. No wheezing, rhonchi or rales.   Abdominal:      General: Bowel sounds are normal. There is no distension.      Palpations: Abdomen is soft. There is no mass.      Tenderness: There is no abdominal tenderness. There is no guarding or rebound.   Musculoskeletal:         General: No swelling, tenderness or deformity.      Cervical back: Normal range of motion and neck supple. No rigidity. No muscular tenderness.   Lymphadenopathy:      Cervical: No cervical adenopathy.   Skin:     General: Skin is warm and dry.       Findings: No erythema, lesion or rash.   Neurological:      Mental Status: He is alert.      Coordination: Coordination normal.      Gait: Gait normal.      Deep Tendon Reflexes: Reflexes normal.          Assessment/Plan:    Well adult exam  Due for influenza vaccine and TDaP. Had covid vaccines.  -TDaP today    Irritable bowel syndrome with both constipation and diarrhea  Seems to have improved previously with bile acid binder. If not continuing to be effective, can consider other treatment for irritable bowel syndrome. While drawing blood, will screen for other systemic causes.  - THYROID STIMULATING HORMONE REFLEX  - CBC WITH DIFFERENTIAL  - CBC WITH AUTOMATED DIFFERENTIAL (PERFORMABLE ONLY)    Overweight  From lack of any exercise and poor diet combined. Will screen for secondary effects.  - COMPREHENSIVE METABOLIC PANEL  - GLYCOHEMOGLOBIN  - LIPID PANEL WITHOUT REFLEX      John Orr MD     No

## 2023-08-03 NOTE — ED PROVIDER NOTE - NSFOLLOWUPINSTRUCTIONS_ED_ALL_ED_FT
Follow up with your PMD within 1-2 days or you can call our clinic at [phone] for an appointment  Take Benadryl 25 mg 1 tab every 8 hrs as needed for lip swelling, itchiness- cautrion drowsiness- do not drive nor operate heavy machinery while taking this medication   Worsening, continued or ANY new concerning symptoms return to the Emergency Department. Follow up with your PMD within 1-2 days or you can call our clinic at [phone] for an appointment  Take Benadryl 25 mg 1 tab every 8 hrs as needed for lip swelling, itchiness- caution drowsiness- do not drive nor operate heavy machinery while taking this medication   Worsening, continued or ANY new concerning symptoms return to the Emergency Department.

## 2023-09-06 ENCOUNTER — EMERGENCY (EMERGENCY)
Facility: HOSPITAL | Age: 23
LOS: 1 days | Discharge: HOME | End: 2023-09-06
Admitting: EMERGENCY MEDICINE
Payer: COMMERCIAL

## 2023-09-06 VITALS
RESPIRATION RATE: 18 BRPM | TEMPERATURE: 99 F | OXYGEN SATURATION: 98 % | DIASTOLIC BLOOD PRESSURE: 86 MMHG | SYSTOLIC BLOOD PRESSURE: 135 MMHG | HEART RATE: 102 BPM

## 2023-09-06 PROCEDURE — 99283 EMERGENCY DEPT VISIT LOW MDM: CPT

## 2023-09-06 RX ORDER — ACETAMINOPHEN 500 MG
975 TABLET ORAL ONCE
Refills: 0 | Status: COMPLETED | OUTPATIENT
Start: 2023-09-06 | End: 2023-09-06

## 2023-09-06 RX ADMIN — Medication 975 MILLIGRAM(S): at 23:13

## 2023-09-06 NOTE — ED PROVIDER NOTE - MUSCULOSKELETAL MINIMAL EXAM
bl calves: no swelling or obv def. + ttp most prominently in the medial regions. full rom, able to ambulet with pain. sensations intact.

## 2023-09-06 NOTE — ED ADULT NURSE NOTE - OBJECTIVE STATEMENT
pt received in intake 10A. pt AAOx4 and ambulatory. pt c/o bilateral leg cramping since Monday. pt denies numbness, tingling to legs. No swelling, redness or warmth noted in assessment. pt denies fever, chills, chest pain and SOB. pt RR are even and unlabored. pt butterflied for labs and sent. pt medicated as ordered. No further orders at this time. Ongoing eval in progress.

## 2023-09-06 NOTE — ED PROVIDER NOTE - OBJECTIVE STATEMENT
21y/o female no PMH presents to ER c/o bl calf pain. Pt. states yesterday wasn't drinking water all day and was outisde walking a lot in the hot sun - states dfeveloped bl belle horse/severe calf cramps in which she had to sit down and massage for 30 minutes. Staets was able to get up but had sevre pain with wlaking. pt woke up this morning and states bl calves are very sore and painful with ambulating. Denies swelling redness warmth fever chills.

## 2023-09-06 NOTE — ED PROVIDER NOTE - CLINICAL SUMMARY MEDICAL DECISION MAKING FREE TEXT BOX
23 y/o female c/o bl calf pain s/p severe calf cramps yesterday  -likely msk pain, r/o electrolyte dysfynction  -cbc bmp   -reassess  -dc with nsaids

## 2023-09-06 NOTE — ED PROVIDER NOTE - NSFOLLOWUPINSTRUCTIONS_ED_ALL_ED_FT
Muscle Cramps and Spasms    Muscle cramps and spasms occur when a muscle or muscles tighten and you have no control over this tightening (involuntary muscle contraction). They are a common problem and can develop in any muscle. The most common place is in the calf muscles of the leg. Muscle cramps and muscle spasms are both involuntary muscle contractions, but there are some differences between the two:  •Muscle cramps are painful. They come and go and may last for a few seconds or up to 15 minutes. Muscle cramps are often more forceful and last longer than muscle spasms.      •Muscle spasms may or may not be painful. They may also last just a few seconds or much longer.    Certain medical conditions, such as diabetes or Parkinson's disease, can make it more likely to develop cramps or spasms. However, cramps or spasms are usually not caused by a serious underlying problem. Common causes include:  •Doing more physical work or exercise than your body is ready for (overexertion).  •Overuse from repeating certain movements too many times.  •Remaining in a certain position for a long period of time.  •Improper preparation, form, or technique while playing a sport or doing an activity.  •Dehydration.  •Injury.  •Side effects of some medicines.  •Abnormally low levels of the salts and minerals in your blood (electrolytes), especially potassium and calcium. This could happen if you are taking water pills (diuretics) or if you are pregnant.      In many cases, the cause of muscle cramps or spasms is not known.      Follow these instructions at home:      Managing pain and stiffness   •Try massaging, stretching, and relaxing the affected muscle. Do this for several minutes at a time.  •If directed, apply heat to tight or tense muscles as often as told by your health care provider. Use the heat source that your health care provider recommends, such as a moist heat pack or a heating pad.  •Place a towel between your skin and the heat source.  •Leave the heat on for 20–30 minutes.  •Remove the heat if your skin turns bright red. This is especially important if you are unable to feel pain, heat, or cold. You may have a greater risk of getting burned.  •If directed, put ice on the affected area. This may help if you are sore or have pain after a cramp or spasm.  •Put ice in a plastic bag.  •Place a towel between your skin and the bag.  •Leave the ice on for 20 minutes, 2–3 times a day.  •Try taking hot showers or baths to help relax tight muscles.    Eating and drinking   •Drink enough fluid to keep your urine pale yellow. Staying well hydrated may help prevent cramps or spasms.  •Eat a healthy diet that includes plenty of nutrients to help your muscles function. A healthy diet includes fruits and vegetables, lean protein, whole grains, and low-fat or nonfat dairy products.    General instructions   •If you are having frequent cramps, avoid intense exercise for several days.  •Take over-the-counter and prescription medicines only as told by your health care provider.  •Pay attention to any changes in your symptoms.  •Keep all follow-up visits as told by your health care provider. This is important.    Contact a health care provider if:  •Your cramps or spasms get more severe or happen more often.  •Your cramps or spasms do not improve over time.    Summary  •Muscle cramps and spasms occur when a muscle or muscles tighten and you have no control over this tightening (involuntary muscle contraction).  •The most common place for cramps or spasms to occur is in the calf muscles of the leg.  •Massaging, stretching, and relaxing the affected muscle may relieve the cramp or spasm.  •Drink enough fluid to keep your urine pale yellow. Staying well hydrated may help prevent cramps or spasms.      This information is not intended to replace advice given to you by your health care provider. Make sure you discuss any questions you have with your health care provider.

## 2023-09-06 NOTE — ED PROVIDER NOTE - PATIENT PORTAL LINK FT
You can access the FollowMyHealth Patient Portal offered by [facility] by registering at the following website: [email or web address]. By joining ’s FollowMyHealth portal, you will also be able to view your health information using other applications (apps) compatible with our system.

## 2023-09-06 NOTE — ED ADULT TRIAGE NOTE - CHIEF COMPLAINT QUOTE
c/o muscle cramping to bilateral calves since Monday, difficulty with bearing weight, Pt able to move toes, sensation intact to foot. Hx: asthma

## 2023-09-07 LAB
ANION GAP SERPL CALC-SCNC: 12 MMOL/L — SIGNIFICANT CHANGE UP (ref 7–14)
BUN SERPL-MCNC: 12 MG/DL — SIGNIFICANT CHANGE UP (ref 7–23)
CALCIUM SERPL-MCNC: 9.4 MG/DL — SIGNIFICANT CHANGE UP (ref 8.4–10.5)
CHLORIDE SERPL-SCNC: 100 MMOL/L — SIGNIFICANT CHANGE UP (ref 98–107)
CO2 SERPL-SCNC: 22 MMOL/L — SIGNIFICANT CHANGE UP (ref 22–31)
CREAT SERPL-MCNC: 0.87 MG/DL — SIGNIFICANT CHANGE UP (ref 0.5–1.3)
EGFR: 97 ML/MIN/1.73M2 — SIGNIFICANT CHANGE UP
GLUCOSE SERPL-MCNC: 79 MG/DL — SIGNIFICANT CHANGE UP (ref 70–99)
HCT VFR BLD CALC: 37.2 % — SIGNIFICANT CHANGE UP (ref 34.5–45)
HGB BLD-MCNC: 11.9 G/DL — SIGNIFICANT CHANGE UP (ref 11.5–15.5)
MAGNESIUM SERPL-MCNC: 2 MG/DL — SIGNIFICANT CHANGE UP (ref 1.6–2.6)
MCHC RBC-ENTMCNC: 20.8 PG — LOW (ref 27–34)
MCHC RBC-ENTMCNC: 32 GM/DL — SIGNIFICANT CHANGE UP (ref 32–36)
MCV RBC AUTO: 64.9 FL — LOW (ref 80–100)
NRBC # BLD: 0 /100 WBCS — SIGNIFICANT CHANGE UP (ref 0–0)
NRBC # FLD: 0 K/UL — SIGNIFICANT CHANGE UP (ref 0–0)
PHOSPHATE SERPL-MCNC: 2.6 MG/DL — SIGNIFICANT CHANGE UP (ref 2.5–4.5)
PLATELET # BLD AUTO: 356 K/UL — SIGNIFICANT CHANGE UP (ref 150–400)
POTASSIUM SERPL-MCNC: 3.9 MMOL/L — SIGNIFICANT CHANGE UP (ref 3.5–5.3)
POTASSIUM SERPL-SCNC: 3.9 MMOL/L — SIGNIFICANT CHANGE UP (ref 3.5–5.3)
RBC # BLD: 5.73 M/UL — HIGH (ref 3.8–5.2)
RBC # FLD: 14.6 % — HIGH (ref 10.3–14.5)
SODIUM SERPL-SCNC: 134 MMOL/L — LOW (ref 135–145)
WBC # BLD: 9.09 K/UL — SIGNIFICANT CHANGE UP (ref 3.8–10.5)
WBC # FLD AUTO: 9.09 K/UL — SIGNIFICANT CHANGE UP (ref 3.8–10.5)

## 2023-10-25 ENCOUNTER — EMERGENCY (EMERGENCY)
Facility: HOSPITAL | Age: 23
LOS: 1 days | Discharge: HOME | End: 2023-10-25
Attending: EMERGENCY MEDICINE | Admitting: EMERGENCY MEDICINE
Payer: COMMERCIAL

## 2023-10-25 VITALS
OXYGEN SATURATION: 100 % | TEMPERATURE: 98 F | HEART RATE: 69 BPM | DIASTOLIC BLOOD PRESSURE: 80 MMHG | SYSTOLIC BLOOD PRESSURE: 135 MMHG | RESPIRATION RATE: 18 BRPM

## 2023-10-25 PROCEDURE — 99284 EMERGENCY DEPT VISIT MOD MDM: CPT | Mod: 25

## 2023-10-25 RX ORDER — OLOPATADINE HYDROCHLORIDE 1 MG/ML
1 SOLUTION/ DROPS OPHTHALMIC
Qty: 1 | Refills: 1
Start: 2023-10-25

## 2023-10-25 RX ORDER — POLYMYXIN B SULF/TRIMETHOPRIM 10000-1/ML
1 DROPS OPHTHALMIC (EYE) ONCE
Refills: 0 | Status: COMPLETED | OUTPATIENT
Start: 2023-10-25 | End: 2023-10-25

## 2023-10-25 RX ORDER — FLUORESCEIN SODIUM 9 MG
1 STRIP OPHTHALMIC (EYE) ONCE
Refills: 0 | Status: COMPLETED | OUTPATIENT
Start: 2023-10-25 | End: 2023-10-25

## 2023-10-25 RX ADMIN — Medication 1 DROP(S): at 09:19

## 2023-10-25 RX ADMIN — Medication 1 APPLICATION(S): at 08:55

## 2023-10-25 NOTE — ED PROVIDER NOTE - OBJECTIVE STATEMENT
Jabier: H/o asthma. C/o a few days L eye irritation, mild redness, and crustiness. No trauma/F. Works in a senior center. Did not respond fully to OTC eye drops and eye allergy meds. No decreased vision/diplopia. Jabier: H/o asthma. C/o a few days L eye irritation, mild redness, and crustiness. No trauma/F. Works in a senior center. Did not respond fully to OTC eye drops and eye allergy meds. No decreased vision/diplopia. No pregnancy S/Sx or missed period.

## 2023-10-25 NOTE — ED PROVIDER NOTE - PHYSICAL EXAMINATION
Well-appearing, well nourished, awake, alert, oriented to person, place, time/situation and in no apparent distress.    Airway patent. Neck supple.    Eyes: OD unremarkable. OS: mild scleral injection. EOMI. PERRL. VA 20/25. No jaundice.    Strong pulse.    Respirations unlabored.    Abdomen soft, non-tender, no guarding.    MSK: Spine appears normal, range of motion is not limited, no muscle or joint tenderness.    Alert and oriented, no gross motor or sensory deficits.    Skin: normal color for race, warm, dry and intact. No evidence of rash.    No SI/HI. Well-appearing, well nourished, awake, alert, oriented to person, place, time/situation and in no apparent distress.    Airway patent. Neck supple.    Eyes: OD unremarkable. OS: mild scleral injection. EOMI. PERRL. No fluorescein uptake. VA 20/25. No jaundice.    Strong pulse.    Respirations unlabored.    Abdomen soft, non-tender, no guarding.    MSK: Spine appears normal, range of motion is not limited, no muscle or joint tenderness.    Alert and oriented, no gross motor or sensory deficits.    Skin: normal color for race, warm, dry and intact. No evidence of rash.    No SI/HI.

## 2023-10-25 NOTE — ED PROVIDER NOTE - NSFOLLOWUPINSTRUCTIONS_ED_ALL_ED_FT
Take medications as prescribed for: conjunctivitis    Use Pataday allergy drops once daily    If not improve in 2 days, use Polytrim eye drops: 1 drop in left eye every 4 hours while awake for 7 days.    If not improve in 2 days after that, follow with Ophthalmology. Call [phone] and ask for an appointment at a convenient location.

## 2023-10-25 NOTE — ED PROVIDER NOTE - PATIENT PORTAL LINK FT
You can access the FollowMyHealth Patient Portal offered by [facility] by registering at the following website: [email or web address]. By joining [employer]’s FollowMyHealth portal, you will also be able to view your health information using other applications (apps) compatible with our system.

## 2023-10-25 NOTE — ED PROVIDER NOTE - CLINICAL SUMMARY MEDICAL DECISION MAKING FREE TEXT BOX
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 No clinical e/o corneal abrasion, episcleritis, scleritis, uveitis. Treat w/ eye Abx and Pataday. F/u Ophtho if not improve.

## 2023-10-25 NOTE — ED ADULT TRIAGE NOTE - CHIEF COMPLAINT QUOTE
Patient c/o left eye redness, itchiness and discharge x 2 days. Denies fevers, chills and sick contacts. Hx. asthma.

## 2023-11-03 NOTE — ED PROVIDER NOTE - CARE PLAN
No care due was identified.  [facility] Embedded Care Due Messages. Reference number: [id number].   11/03/2023 1:07:01 PM CDT   Principal Discharge DX:	Allergic rhinitis Fall with Harm Risk

## 2023-11-14 ENCOUNTER — APPOINTMENT (OUTPATIENT)
Dept: INTERNAL MEDICINE | Facility: CLINIC | Age: 23
End: 2023-11-14

## 2023-11-16 ENCOUNTER — APPOINTMENT (OUTPATIENT)
Dept: INTERNAL MEDICINE | Facility: CLINIC | Age: 23
End: 2023-11-16

## 2023-11-17 ENCOUNTER — LABORATORY RESULT (OUTPATIENT)
Age: 23
End: 2023-11-17

## 2023-11-17 ENCOUNTER — APPOINTMENT (OUTPATIENT)
Dept: INTERNAL MEDICINE | Facility: CLINIC | Age: 23
End: 2023-11-17
Payer: COMMERCIAL

## 2023-11-17 ENCOUNTER — TRANSCRIPTION ENCOUNTER (OUTPATIENT)
Age: 23
End: 2023-11-17

## 2023-11-17 ENCOUNTER — OUTPATIENT (OUTPATIENT)
Dept: OUTPATIENT SERVICES | Facility: HOSPITAL | Age: 23
LOS: 1 days | End: 2023-11-17

## 2023-11-17 VITALS
HEIGHT: 62 IN | DIASTOLIC BLOOD PRESSURE: 73 MMHG | BODY MASS INDEX: 36.44 KG/M2 | SYSTOLIC BLOOD PRESSURE: 122 MMHG | WEIGHT: 198 LBS | HEART RATE: 81 BPM | OXYGEN SATURATION: 98 %

## 2023-11-17 DIAGNOSIS — Z11.3 ENCOUNTER FOR SCREENING FOR INFECTIONS WITH A PREDOMINANTLY SEXUAL MODE OF TRANSMISSION: ICD-10-CM

## 2023-11-17 DIAGNOSIS — H61.23 IMPACTED CERUMEN, BILATERAL: ICD-10-CM

## 2023-11-17 DIAGNOSIS — J30.2 OTHER SEASONAL ALLERGIC RHINITIS: ICD-10-CM

## 2023-11-17 DIAGNOSIS — L30.9 DERMATITIS, UNSPECIFIED: ICD-10-CM

## 2023-11-17 DIAGNOSIS — D64.9 ANEMIA, UNSPECIFIED: ICD-10-CM

## 2023-11-17 PROCEDURE — 99214 OFFICE O/P EST MOD 30 MIN: CPT | Mod: 25

## 2023-11-20 DIAGNOSIS — D64.9 ANEMIA, UNSPECIFIED: ICD-10-CM

## 2023-11-20 DIAGNOSIS — L30.9 DERMATITIS, UNSPECIFIED: ICD-10-CM

## 2023-11-20 DIAGNOSIS — H61.23 IMPACTED CERUMEN, BILATERAL: ICD-10-CM

## 2023-11-20 DIAGNOSIS — Z11.3 ENCOUNTER FOR SCREENING FOR INFECTIONS WITH A PREDOMINANTLY SEXUAL MODE OF TRANSMISSION: ICD-10-CM

## 2023-11-20 DIAGNOSIS — J30.2 OTHER SEASONAL ALLERGIC RHINITIS: ICD-10-CM

## 2023-11-21 DIAGNOSIS — D57.3 SICKLE-CELL TRAIT: ICD-10-CM

## 2023-11-21 LAB
25(OH)D3 SERPL-MCNC: 20 NG/ML
BASOPHILS # BLD AUTO: 0.03 K/UL
BASOPHILS NFR BLD AUTO: 0.6 %
C TRACH RRNA SPEC QL NAA+PROBE: NOT DETECTED
EOSINOPHIL # BLD AUTO: 0.03 K/UL
EOSINOPHIL NFR BLD AUTO: 0.6 %
FERRITIN SERPL-MCNC: 50 NG/ML
FOLATE SERPL-MCNC: 11.5 NG/ML
HCT VFR BLD CALC: 37.2 %
HCV AB SER QL: NONREACTIVE
HCV S/CO RATIO: 0.14 S/CO
HGB BLD-MCNC: 12 G/DL
HIV1+2 AB SPEC QL IA.RAPID: NONREACTIVE
IMM GRANULOCYTES NFR BLD AUTO: 0.2 %
IRON SATN MFR SERPL: 14 %
IRON SERPL-MCNC: 41 UG/DL
LYMPHOCYTES # BLD AUTO: 1.49 K/UL
LYMPHOCYTES NFR BLD AUTO: 29 %
MAN DIFF?: NORMAL
MCHC RBC-ENTMCNC: 21.4 PG
MCHC RBC-ENTMCNC: 32.3 GM/DL
MCV RBC AUTO: 66.2 FL
MONOCYTES # BLD AUTO: 0.42 K/UL
MONOCYTES NFR BLD AUTO: 8.2 %
N GONORRHOEA RRNA SPEC QL NAA+PROBE: NOT DETECTED
NEUTROPHILS # BLD AUTO: 3.16 K/UL
NEUTROPHILS NFR BLD AUTO: 61.4 %
PLATELET # BLD AUTO: 408 K/UL
RBC # BLD: 5.62 M/UL
RBC # FLD: 14.6 %
SOURCE AMPLIFICATION: NORMAL
T PALLIDUM AB SER QL IA: NEGATIVE
TIBC SERPL-MCNC: 300 UG/DL
TRANSFERRIN SERPL-MCNC: 252 MG/DL
UIBC SERPL-MCNC: 260 UG/DL
VIT B12 SERPL-MCNC: 447 PG/ML
WBC # FLD AUTO: 5.14 K/UL

## 2023-12-14 ENCOUNTER — APPOINTMENT (OUTPATIENT)
Dept: INTERNAL MEDICINE | Facility: CLINIC | Age: 23
End: 2023-12-14

## 2024-01-29 ENCOUNTER — NON-APPOINTMENT (OUTPATIENT)
Age: 24
End: 2024-01-29

## 2024-01-30 ENCOUNTER — TRANSCRIPTION ENCOUNTER (OUTPATIENT)
Age: 24
End: 2024-01-30

## 2024-01-31 ENCOUNTER — LABORATORY RESULT (OUTPATIENT)
Age: 24
End: 2024-01-31

## 2024-01-31 ENCOUNTER — APPOINTMENT (OUTPATIENT)
Dept: INTERNAL MEDICINE | Facility: CLINIC | Age: 24
End: 2024-01-31
Payer: COMMERCIAL

## 2024-01-31 ENCOUNTER — NON-APPOINTMENT (OUTPATIENT)
Age: 24
End: 2024-01-31

## 2024-01-31 ENCOUNTER — OUTPATIENT (OUTPATIENT)
Dept: OUTPATIENT SERVICES | Facility: HOSPITAL | Age: 24
LOS: 1 days | End: 2024-01-31

## 2024-01-31 VITALS
DIASTOLIC BLOOD PRESSURE: 70 MMHG | OXYGEN SATURATION: 98 % | WEIGHT: 197 LBS | SYSTOLIC BLOOD PRESSURE: 119 MMHG | BODY MASS INDEX: 36.25 KG/M2 | HEIGHT: 62 IN | HEART RATE: 95 BPM

## 2024-01-31 DIAGNOSIS — R00.2 PALPITATIONS: ICD-10-CM

## 2024-01-31 PROCEDURE — 99213 OFFICE O/P EST LOW 20 MIN: CPT

## 2024-01-31 NOTE — PHYSICAL EXAM
[No Respiratory Distress] : no respiratory distress  [No Accessory Muscle Use] : no accessory muscle use [Clear to Auscultation] : lungs were clear to auscultation bilaterally [No Varicosities] : no varicosities [Pedal Pulses Present] : the pedal pulses are present [No Edema] : there was no peripheral edema [Soft] : abdomen soft [Non Tender] : non-tender [Non-distended] : non-distended [Normal Bowel Sounds] : normal bowel sounds [Normal] : normal rate, regular rhythm, normal S1 and S2 and no murmur heard [Normal Affect] : the affect was normal [Alert and Oriented x3] : oriented to person, place, and time [Normal Insight/Judgement] : insight and judgment were intact

## 2024-02-01 RX ORDER — CHROMIUM 200 MCG
25 MCG TABLET ORAL DAILY
Qty: 90 | Refills: 3 | Status: ACTIVE | COMMUNITY
Start: 2023-02-10 | End: 1900-01-01

## 2024-02-02 NOTE — HISTORY OF PRESENT ILLNESS
[FreeTextEntry8] : Patient is a 24 y/o F with PMHx of anxiety/depression, asthma coming in for an acute visit.  She reports that for the past 2 days she has been getting palpitations at rest. She denies fever, chills, chest pain, SOB, denies diaphoresis, syncopal episodes and denies dizziness.  She also complaints that here hands and feet feels cold and would like to get her Iron level checked.

## 2024-02-02 NOTE — REVIEW OF SYSTEMS
[Palpitations] : palpitations [Negative] : Integumentary [Chest Pain] : no chest pain [Leg Claudication] : no leg claudication [Lower Ext Edema] : no lower extremity edema [Orthopnea] : no orthopnea [Paroxysmal Nocturnal Dyspnea] : no paroxysmal nocturnal dyspnea

## 2024-03-01 ENCOUNTER — APPOINTMENT (OUTPATIENT)
Age: 24
End: 2024-03-01
Payer: COMMERCIAL

## 2024-03-01 PROCEDURE — D0120: CPT

## 2024-03-01 PROCEDURE — D0274: CPT

## 2024-03-01 PROCEDURE — D1110 PROPHYLAXIS - ADULT: CPT

## 2024-03-07 NOTE — ED PROVIDER NOTE - NS ED NOTE AC HIGH RISK COUNTRIES
SUPPORTIVE AND PALLIATIVE ONCOLOGY OUTPATIENT FOLLOW-UP    Virtual or Telephone Consent    An interactive audio and video telecommunication system which permits real time communications between the patient (at the originating site) and provider (at the distant site) was utilized to provide this telehealth service.   Verbal consent was requested and obtained from Sharmila Huerta on this date, 03/07/24 for a telehealth visit.       SERVICE DATE: 3/7/2024    Subjective   HISTORY OF PRESENT ILLNESS: Sharmila Huerta is a 64 y.o. 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 She follows with Dr. Parker as her primary oncologist, currently pursuing maintenance therapy with Olaparib.  She has been referred to Supportive Oncology/Palliative Care for management of neuropathy, neoplasm related pain, loss of appetite, and fatigue.      Pain Assessment:  Pain Score:    Location:    Description:      Symptom Assessment:  Pain:none  Numbness or Tingling in hands/feet/other: a little - fingers are stable, feet continue to be bothersome at times in b/l feet. Overall manageable with current regimen of Pregabalin, Topiramate, and Duloxetine  Sore Muscles/Spasms: none  Headache: none  Dizziness:none  Constipation: a little - relieved with laxatives as needed  Diarrhea: none  Nausea: a little - intermittent, relieved with antiemetics  Vomiting: none  Lack of Appetite: none   Weight Loss: none  Taste changes: none  Dry Mouth: none  Pain in Mouth/Swallowing: none  Lack of Energy: a little - general fatigue from Olaparib but tolerable  Difficulty Sleeping: none  Worrying: none  Anxiety: none  Depression: a little - feels overall she is feeling better following loss of her brother. Has good support around her. Has not pursued counseling or anything, does not feel she needs anything at this time.  Shortness of breath: none  Other: none      Information obtained from: chart review and interview of  patient  ______________________________________________________________________        Objective   Lab on 03/01/2024   Component Date Value Ref Range Status    WBC 03/01/2024 7.3  4.4 - 11.3 x10*3/uL Final    nRBC 03/01/2024 0.0  0.0 - 0.0 /100 WBCs Final    RBC 03/01/2024 3.42 (L)  4.00 - 5.20 x10*6/uL Final    Hemoglobin 03/01/2024 9.8 (L)  12.0 - 16.0 g/dL Final    Hematocrit 03/01/2024 30.6 (L)  36.0 - 46.0 % Final    MCV 03/01/2024 90  80 - 100 fL Final    MCH 03/01/2024 28.7  26.0 - 34.0 pg Final    MCHC 03/01/2024 32.0  32.0 - 36.0 g/dL Final    RDW 03/01/2024 17.3 (H)  11.5 - 14.5 % Final    Platelets 03/01/2024 284  150 - 450 x10*3/uL Final    Neutrophils % 03/01/2024 62.0  40.0 - 80.0 % Final    Immature Granulocytes %, Automated 03/01/2024 0.3  0.0 - 0.9 % Final    Lymphocytes % 03/01/2024 30.7  13.0 - 44.0 % Final    Monocytes % 03/01/2024 5.9  2.0 - 10.0 % Final    Eosinophils % 03/01/2024 0.7  0.0 - 6.0 % Final    Basophils % 03/01/2024 0.4  0.0 - 2.0 % Final    Neutrophils Absolute 03/01/2024 4.50  1.20 - 7.70 x10*3/uL Final    Immature Granulocytes Absolute, Au* 03/01/2024 0.02  0.00 - 0.70 x10*3/uL Final    Lymphocytes Absolute 03/01/2024 2.23  1.20 - 4.80 x10*3/uL Final    Monocytes Absolute 03/01/2024 0.43  0.10 - 1.00 x10*3/uL Final    Eosinophils Absolute 03/01/2024 0.05  0.00 - 0.70 x10*3/uL Final    Basophils Absolute 03/01/2024 0.03  0.00 - 0.10 x10*3/uL Final    Glucose 03/01/2024 101 (H)  74 - 99 mg/dL Final    Sodium 03/01/2024 139  136 - 145 mmol/L Final    Potassium 03/01/2024 4.2  3.5 - 5.3 mmol/L Final    Chloride 03/01/2024 105  98 - 107 mmol/L Final    Bicarbonate 03/01/2024 24  21 - 32 mmol/L Final    Anion Gap 03/01/2024 14  10 - 20 mmol/L Final    Urea Nitrogen 03/01/2024 12  6 - 23 mg/dL Final    Creatinine 03/01/2024 0.95  0.50 - 1.05 mg/dL Final    eGFR 03/01/2024 67  >60 mL/min/1.73m*2 Final    Calcium 03/01/2024 9.5  8.6 - 10.6 mg/dL Final    Albumin 03/01/2024 3.8  3.4 - 5.0  "g/dL Final    Alkaline Phosphatase 03/01/2024 60  33 - 136 U/L Final    Total Protein 03/01/2024 6.8  6.4 - 8.2 g/dL Final    AST 03/01/2024 10  9 - 39 U/L Final    Bilirubin, Total 03/01/2024 0.3  0.0 - 1.2 mg/dL Final    ALT 03/01/2024 8  7 - 45 U/L Final    Cancer  03/01/2024 5.2  0.0 - 30.2 U/mL Final     PHYSICAL EXAMINATION   Vital Signs:       7/10/2023     2:05 PM 9/19/2023     1:34 PM 10/12/2023     9:00 AM 10/31/2023    11:36 AM 11/2/2023     2:12 PM 11/8/2023     3:39 PM 12/27/2023    12:31 PM   Vitals   Systolic  125  137 106 127 117   Diastolic  65  78 64 78 68   Heart Rate     85 88    Temp     36.6 °C (97.9 °F) 36.3 °C (97.3 °F)    Resp     16 16    Height (in) 1.651 m (5' 5\")  1.661 m (5' 5.39\")       Weight (lb) 257 250 258.6 257 259.26 255.95 253   BMI 42.77 kg/m2 41.6 kg/m2 42.52 kg/m2 42.25 kg/m2 42.63 kg/m2 42.08 kg/m2 41.6 kg/m2   BSA (m2) 2.32 m2 2.28 m2 2.32 m2 2.32 m2 2.33 m2 2.31 m2 2.3 m2   Visit Report    Report Report Report Report      Physical Exam  Not complete dt virtual visit    ASSESSMENT/PLAN    Arthralgia Pain/CIPN  - Continue Pregabalin 200mg TID - encouraged to take as prescribed  - Continue Duloxetine 90mg once daily at bed  - Continue Topiramate 100mg BID  - Continue Acetaminophen 500mg [id number] PRN for pain - rare use     Constipation  - Continue Docusate 100mg BID PRN  - Continue Miralax 17grams 1-2x per day PRN  - Continue Milk of Magnesia PRN for no BM in 2-3 days PRN  - Continue Probiotic Drink     Nausea  - Continue Ondansetron 8mg [id number] PRN  - Continue Prochlorperazine 10mg [id number]     SOB/Cough  - Continue Albuterol 90mcg/inh; 2 puffs [id number] PRN for SOB  - Continue Benzonatate 200mg TID PRN for cough     Depression/Insomnia  - Referrals sent to Music Therapy and Spiritual Therapy  - Email sent to  for assistance with LW/POA and other home needs  - Referral sent to Smoking Cessation Program   - Registered at [facility] - encouraged to attend for " counseling services and assistance with grief coping in loss of her brother  - Continue Duloxetine 90mg at bed - discussed increase but patient declines  - Continue Trazodone 100mg at bed PRN     Fatigue  - Continue to monitor with decreased dose of Olaparib   - Concern for depression    Next Follow-Up Visit:  Return to clinic in 3 months in person    Signature and billing  Medical complexity was moderate level due to due to complexity of problems, extensive data review, and high risk of management/treatment.  Time was spent on the following: Prep Time, Time Directly with Patient/Family/Caregiver, Documentation Time. Total time spent: 35      Data  Diagnostic tests and information reviewed for today's visit:  Most recent labs and imaging results, Medications       Some elements copied from Palliative Care note on 1/3/24, the elements have been updated and all reflect current decision making from today, 3/7/2024.      Plan of Care discussed with: Patient    SIGNATURE: EBONI Syed-CNP    Contact information:  Supportive and Palliative Oncology  Monday-Friday 8 AM-5 PM  Phone:  [phone], press option #5, then option #1.   Or Epic Secure Chat        No

## 2024-03-08 ENCOUNTER — APPOINTMENT (OUTPATIENT)
Dept: CARDIOLOGY | Facility: CLINIC | Age: 24
End: 2024-03-08

## 2024-03-14 NOTE — PHYSICAL EXAM
Roddy is a pleasant 81-year-old male who presents today for a follow up.  MRI abdomen/MRCP with and without contrast dated 2/9/2024 showed cirrhosis without suspicious mass.  Recommend continued routine imaging surveillance for HCC.  Mild sequela of portal hypertension.  Stable pancreatic cystic lesions, the largest measuring 3.3 cm in the body most consistent with sidebranch IPMN.  No other worrisome features or high risk stigmata.  Recommend continued surveillance/follow-up with endoscopic ultrasound and/or MRI  Labs dated 1/26/2024 showed a normal hemoglobin.  Platelets 147.  PT 15.1, INR 1.21.  Creatinine 1.49, GFR 46.9.  Normal LFTs.  aFP normal.  Colonoscopy dated 1/3/2024 showed multiple colon polyps.  Suboptimal prep.  Pandiverticulosis coli.  Mild internal hemorrhoids.  Distal sigmoid colon polypectomy showed polypoid fragments of colonic mucosa suggestive of a hyperplastic polyp.  Hepatic flexure polypectomy showed tubular adenoma.  Cecal polypectomy showed fragments of polypoid colonic mucosa with underlying lymphoid aggregates.  Mid descending polypectomy consistent with tubular adenoma.  Sigmoid colon polyp x 2 consistent with tubular adenoma.  Distal sigmoid colon polyp consistent with tubular adenoma.  Patient was evaluated by surgical oncology for history of pancreatic cyst.  Plan is for surveillance.  Plan to repeat MRI 1 year from prior study  Colonoscopy dated 6/9/23 showed two diminutive polyps removed from the cecum. 6 mm polyp removed from the cecum. One clip was placed. 7 mm polyp removed from the cecum. Clip was placed. Four diminutive polyps removed from the mid descending colon. 8 mm polyp removed from the distal descending colon in piecemeal fashion. Two 7-8 mm polyps removed from the distal descending colon. Two 6-8 mm polyps removed from the mid sigmoid colon. Diminutive polyp removed from the mid sigmoid colon. Diverticulosis in the sigmoid colon and non bleeding internal hemorrhoids present upon retroflexion. Repeat colonoscopy in 6 months. All polyps consistent with tubular adenomas  EGD/EUS dated 6/14/2023.  Anechoic lesion in the body of the pancreas consistent with pancreatic cyst without any worrisome features septated in nature measuring approximately 2.5 to 2.6 x 1.5 to 1.6 cm status post FNA of approximately 1 mL of clear, slightly blood-tinged fluid but could not definitively appreciate PD connection.  Minimal inflammation at the GE junction.  Mild gastritis.  Will refer to Dr. Nelson for further evaluation.  CEA in the fluid 167.2.  Gastric antral and fundic type mucosa with reactive gastropathy mild chronic gastritis.  Negative for H. pylori.  GE junction biopsies with mild acute chronic gastritis and focal intestinal metaplasia.  No dysplasia or malignancy seen  Myriad genetic testing pending. Currently on omeprazole 20 mg qd. Notes 1-2 daily bowel movements without constipation or diarrhea. Denies nausea, vomiting, heartburn, reflux, dysphagia, odynophagia, hematemesis, coffee ground emesis, abdominal pain, change in bowel habits, abnormal weight loss, BRBPR or melena. Denies family history of colon cancer or colon polyps. No other GI complaints at this time. Denies EtOH use. MELD Na score of 12 [Alert] : alert [No Acute Distress] : no acute distress [Normocephalic] : normocephalic [EOMI Bilateral] : EOMI bilateral [Clear tympanic membranes with bony landmarks and light reflex present bilaterally] : clear tympanic membranes with bony landmarks and light reflex present bilaterally  [Pink Nasal Mucosa] : pink nasal mucosa [Nonerythematous Oropharynx] : nonerythematous oropharynx [Supple, full passive range of motion] : supple, full passive range of motion [No Palpable Masses] : no palpable masses [Clear to Ausculatation Bilaterally] : clear to auscultation bilaterally [Regular Rate and Rhythm] : regular rate and rhythm [Normal S1, S2 audible] : normal S1, S2 audible [No Murmurs] : no murmurs [+2 Femoral Pulses] : +2 femoral pulses [Soft] : soft [NonTender] : non tender [Non Distended] : non distended [Normoactive Bowel Sounds] : normoactive bowel sounds [No Hepatomegaly] : no hepatomegaly [No Splenomegaly] : no splenomegaly [No Abnormal Lymph Nodes Palpated] : no abnormal lymph nodes palpated [Normal Muscle Tone] : normal muscle tone [No Gait Asymmetry] : no gait asymmetry [No pain or deformities with palpation of bone, muscles, joints] : no pain or deformities with palpation of bone, muscles, joints [+2 Patella DTR] : +2 patella DTR [Cranial Nerves Grossly Intact] : cranial nerves grossly intact [No Rash or Lesions] : no rash or lesions

## 2024-04-03 ENCOUNTER — APPOINTMENT (OUTPATIENT)
Age: 24
End: 2024-04-03

## 2024-05-20 ENCOUNTER — APPOINTMENT (OUTPATIENT)
Dept: INTERNAL MEDICINE | Facility: CLINIC | Age: 24
End: 2024-05-20
Payer: MEDICAID

## 2024-05-20 ENCOUNTER — OUTPATIENT (OUTPATIENT)
Dept: OUTPATIENT SERVICES | Facility: HOSPITAL | Age: 24
LOS: 1 days | End: 2024-05-20

## 2024-05-20 ENCOUNTER — LABORATORY RESULT (OUTPATIENT)
Age: 24
End: 2024-05-20

## 2024-05-20 VITALS
WEIGHT: 184 LBS | BODY MASS INDEX: 33.86 KG/M2 | SYSTOLIC BLOOD PRESSURE: 100 MMHG | DIASTOLIC BLOOD PRESSURE: 70 MMHG | OXYGEN SATURATION: 98 % | HEART RATE: 99 BPM | HEIGHT: 62 IN

## 2024-05-20 DIAGNOSIS — E04.9 NONTOXIC GOITER, UNSPECIFIED: ICD-10-CM

## 2024-05-20 DIAGNOSIS — J45.990 EXERCISE INDUCED BRONCHOSPASM: ICD-10-CM

## 2024-05-20 DIAGNOSIS — Z00.00 ENCOUNTER FOR GENERAL ADULT MEDICAL EXAMINATION WITHOUT ABNORMAL FINDINGS: ICD-10-CM

## 2024-05-20 DIAGNOSIS — E66.9 OBESITY, UNSPECIFIED: ICD-10-CM

## 2024-05-20 DIAGNOSIS — R94.31 ABNORMAL ELECTROCARDIOGRAM [ECG] [EKG]: ICD-10-CM

## 2024-05-20 DIAGNOSIS — H61.20 IMPACTED CERUMEN, UNSPECIFIED EAR: ICD-10-CM

## 2024-05-20 DIAGNOSIS — Z00.00 ENCOUNTER FOR GENERAL ADULT MEDICAL EXAMINATION W/OUT ABNORMAL FINDINGS: ICD-10-CM

## 2024-05-20 DIAGNOSIS — R00.2 PALPITATIONS: ICD-10-CM

## 2024-05-20 DIAGNOSIS — Z78.9 OTHER SPECIFIED HEALTH STATUS: ICD-10-CM

## 2024-05-20 PROCEDURE — 99395 PREV VISIT EST AGE 18-39: CPT

## 2024-05-20 RX ORDER — LORATADINE 10 MG/1
10 TABLET ORAL
Qty: 1 | Refills: 1 | Status: ACTIVE | COMMUNITY
Start: 2022-07-20 | End: 1900-01-01

## 2024-05-20 RX ORDER — TRIAMCINOLONE ACETONIDE 1 MG/G
0.1 OINTMENT TOPICAL
Qty: 1 | Refills: 0 | Status: ACTIVE | COMMUNITY
Start: 2018-12-10 | End: 1900-01-01

## 2024-05-20 RX ORDER — ASPIRIN 81 MG
6.5 TABLET, DELAYED RELEASE (ENTERIC COATED) ORAL
Qty: 1 | Refills: 0 | Status: DISCONTINUED | COMMUNITY
Start: 2023-11-17 | End: 2024-05-20

## 2024-05-20 RX ORDER — ALBUTEROL SULFATE 90 UG/1
108 (90 BASE) INHALANT RESPIRATORY (INHALATION)
Qty: 1 | Refills: 1 | Status: ACTIVE | COMMUNITY
Start: 2019-03-25 | End: 1900-01-01

## 2024-05-20 NOTE — PHYSICAL EXAM
[No Acute Distress] : no acute distress [Normal Sclera/Conjunctiva] : normal sclera/conjunctiva [PERRL] : pupils equal round and reactive to light [Supple] : supple [No Respiratory Distress] : no respiratory distress  [No Accessory Muscle Use] : no accessory muscle use [Clear to Auscultation] : lungs were clear to auscultation bilaterally [Normal Rate] : normal rate  [Regular Rhythm] : with a regular rhythm [Normal S1, S2] : normal S1 and S2 [No Edema] : there was no peripheral edema [Soft] : abdomen soft [Non Tender] : non-tender [Non-distended] : non-distended [Normal Bowel Sounds] : normal bowel sounds [No Spinal Tenderness] : no spinal tenderness [Grossly Normal Strength/Tone] : grossly normal strength/tone [No Focal Deficits] : no focal deficits [Normal Affect] : the affect was normal [Normal Insight/Judgement] : insight and judgment were intact [de-identified] : cerumen impaction  [de-identified] : fullness noted at the level of the thyroid

## 2024-05-20 NOTE — HEALTH RISK ASSESSMENT
[HIV Test offered] : HIV Test offered [Hepatitis C test offered] : Hepatitis C test offered [No] : No [Monthly or less (1 pt)] : Monthly or less (1 point) [1 or 2 (0 pts)] : 1 or 2 (0 points) [Never (0 pts)] : Never (0 points) [Audit-CScore] : 1 [Never] : Never [Sexually Active] : not sexually active [Fully functional (bathing, dressing, toileting, transferring, walking, feeding)] : Fully functional (bathing, dressing, toileting, transferring, walking, feeding) [Fully functional (using the telephone, shopping, preparing meals, housekeeping, doing laundry, using] : Fully functional and needs no help or supervision to perform IADLs (using the telephone, shopping, preparing meals, housekeeping, doing laundry, using transportation, managing medications and managing finances)

## 2024-05-20 NOTE — PLAN
[FreeTextEntry1] : Patient is a 23-year-old F with hx of anxiety/depression, asthma coming in for a CPE.  #concern for hemoglobinopathy/FARHAD? -pt denies any hx of pain crisis, was told she had sickle cell trait -will obtain cbc with iron studies, hg electrophoresis -heme referral   #neck fullness - will obtain thyroid US to assess for abnormalities -TSH ordered  #cerumen impaction -ENT referral provided   #hx of low vit D -vit D ordered  #hx of palpitations/ abnormal EKG -recent EKG showing nonspecific T wave abnormalities -cardiology referral provided   #obesity - pt now having successful wt loss with lifestyle changes -cont w healthy eating and exercise   #exercise induced asthma -controlled  - on PRN albuterol (refilled)  #HCM - declines COVID vaccination/flu vaccine  - up to date with Tdap  - GYN referral for possible pap   f/u in 1-3 mo to establish care w new PCP

## 2024-05-20 NOTE — HISTORY OF PRESENT ILLNESS
[FreeTextEntry1] : CPE [de-identified] : Patient is a 23-year-old F with hx of anxiety/depression, asthma coming in for a CPE.  Patient endorses feeling well today. Denies any active concern. Reports her palpitations have resolved. Pt thinks it could have been in the setting of feeling anxious.  Pt reports working out and eating healthy meals/reduced portions. 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  Endorses that her asthma is well controlled. Uses albuterol infrequently.

## 2024-05-21 DIAGNOSIS — R79.89 OTHER SPECIFIED ABNORMAL FINDINGS OF BLOOD CHEMISTRY: ICD-10-CM

## 2024-05-21 LAB
25(OH)D3 SERPL-MCNC: 20.7 NG/ML
ALBUMIN SERPL ELPH-MCNC: 4.2 G/DL
ALP BLD-CCNC: 87 U/L
ALT SERPL-CCNC: 61 U/L
ANION GAP SERPL CALC-SCNC: 12 MMOL/L
AST SERPL-CCNC: 32 U/L
BASOPHILS # BLD AUTO: 0.01 K/UL
BASOPHILS NFR BLD AUTO: 0.2 %
BILIRUB SERPL-MCNC: 0.3 MG/DL
BUN SERPL-MCNC: 14 MG/DL
C TRACH RRNA SPEC QL NAA+PROBE: NOT DETECTED
CALCIUM SERPL-MCNC: 9.9 MG/DL
CHLORIDE SERPL-SCNC: 104 MMOL/L
CHOLEST SERPL-MCNC: 112 MG/DL
CO2 SERPL-SCNC: 23 MMOL/L
CREAT SERPL-MCNC: 0.74 MG/DL
EGFR: 117 ML/MIN/1.73M2
EOSINOPHIL # BLD AUTO: 0.02 K/UL
EOSINOPHIL NFR BLD AUTO: 0.3 %
ESTIMATED AVERAGE GLUCOSE: 117 MG/DL
FERRITIN SERPL-MCNC: 90 NG/ML
FOLATE SERPL-MCNC: 15.1 NG/ML
GLUCOSE SERPL-MCNC: 91 MG/DL
HBA1C MFR BLD HPLC: 5.7 %
HBV SURFACE AG SER QL: NONREACTIVE
HCT VFR BLD CALC: 36.5 %
HCV AB SER QL: NONREACTIVE
HCV S/CO RATIO: 0.17 S/CO
HDLC SERPL-MCNC: 45 MG/DL
HGB A MFR BLD: 67.4 %
HGB A2 MFR BLD: 3.9 %
HGB BLD-MCNC: 11.4 G/DL
HGB C MFR BLD: 26.9 %
HGB F MFR BLD: 1.8 %
HGB FRACT BLD-IMP: NORMAL
HGB S BLD QL SOLY: NEGATIVE
HIV1+2 AB SPEC QL IA.RAPID: NONREACTIVE
IMM GRANULOCYTES NFR BLD AUTO: 0.2 %
IRON SATN MFR SERPL: 13 %
IRON SERPL-MCNC: 40 UG/DL
LDLC SERPL CALC-MCNC: 55 MG/DL
LYMPHOCYTES # BLD AUTO: 1.48 K/UL
LYMPHOCYTES NFR BLD AUTO: 23.8 %
MAN DIFF?: NORMAL
MCHC RBC-ENTMCNC: 20 PG
MCHC RBC-ENTMCNC: 31.2 GM/DL
MCV RBC AUTO: 64 FL
MONOCYTES # BLD AUTO: 0.7 K/UL
MONOCYTES NFR BLD AUTO: 11.3 %
N GONORRHOEA RRNA SPEC QL NAA+PROBE: NOT DETECTED
NEUTROPHILS # BLD AUTO: 4 K/UL
NEUTROPHILS NFR BLD AUTO: 64.2 %
NONHDLC SERPL-MCNC: 66 MG/DL
PLATELET # BLD AUTO: 347 K/UL
POTASSIUM SERPL-SCNC: 4.6 MMOL/L
PROT SERPL-MCNC: 7.7 G/DL
RBC # BLD: 5.7 M/UL
RBC # BLD: 5.7 M/UL
RBC # FLD: 14 %
RETICS # AUTO: 0.7 %
RETICS AGGREG/RBC NFR: 38.8 K/UL
SODIUM SERPL-SCNC: 139 MMOL/L
SOURCE AMPLIFICATION: NORMAL
T PALLIDUM AB SER QL IA: NEGATIVE
TIBC SERPL-MCNC: 309 UG/DL
TRANSFERRIN SERPL-MCNC: 233 MG/DL
TRIGL SERPL-MCNC: 45 MG/DL
TSH SERPL-ACNC: <0.01 UIU/ML
UIBC SERPL-MCNC: 269 UG/DL
VIT B12 SERPL-MCNC: 427 PG/ML
WBC # FLD AUTO: 6.22 K/UL

## 2024-05-24 ENCOUNTER — NON-APPOINTMENT (OUTPATIENT)
Age: 24
End: 2024-05-24

## 2024-05-24 NOTE — CONSULT LETTER
[FreeTextEntry1] : dr La Nena Tim  [FreeTextEntry2] : abnormal  TFT  [FreeTextEntry3] : Patient is a 23-year-old female with history of eczema, exercise-induced asthma, hypertension, morbid obesity, found to have abnormal thyroid function studies consistent with hyperthyroidism. Per chart review patient has been experiencing palpitations and some intentional weight loss.  In addition she has noticed neck fullness.  No fevers or chills no chest pains or pressure no loose bowel movements.  Past medical history As above Sickle cell trait  Past surgical history none  Family history Diabetes Sickle cell anemia   Social history Non-smoker,  no alcohol use  Medications  Albuterol inhaler Folic acid Loratadine Triamcinolone Vitamin D 1000 IU daily   Allergies No known drug allergies Varies Pollen Strawberries  Labs May 20, 2024  	Flag	Reference	Goal 	TSHX	<0.01 uIU/mL	L	0.27-4.20	   	Test  	Result  	Flag	Reference	Goal 	Free T4	2.3 ng/dL	H	0.9-1.8	  Labs January 31, 2024    	TSHX	0.30 uIU/mL	 	0.27-4.20   	Test  	Result  	Flag	Reference	Goal  	Free T4	1.2 ng/dL	 	0.9-1.8	    [FreeTextEntry4] : Patient with abnormal thyroid function studies consistent with hypothyroidism In onset Patient seems to be symptomatic with palpitations and some weight loss although patient states it has been intentional weight loss  Would recommend repeating TSH, free T4, free T3, antithyroid antibodies, thyroid-stimulating immunoglobulin with patient holding any biotin containing supplements for 3 days prior.  In addition please ask patient if she has added any new supplements to her regimen over the past 3 months.  January 2024 blood work showed normal thyroid function studies. Agree with thyroid ultrasound-it looks as though patient may be scheduled for mid June Please reach out to me when the above labs are resulted  [FreeTextEntry5] : If there is any further questions, please reach out to me

## 2024-05-28 RX ORDER — FOLIC ACID 1 MG/1
1 TABLET ORAL DAILY
Qty: 90 | Refills: 1 | Status: ACTIVE | COMMUNITY
Start: 2024-05-21 | End: 1900-01-01

## 2024-05-30 ENCOUNTER — TRANSCRIPTION ENCOUNTER (OUTPATIENT)
Age: 24
End: 2024-05-30

## 2024-06-13 ENCOUNTER — APPOINTMENT (OUTPATIENT)
Dept: ULTRASOUND IMAGING | Facility: IMAGING CENTER | Age: 24
End: 2024-06-13
Payer: MEDICAID

## 2024-06-13 ENCOUNTER — OUTPATIENT (OUTPATIENT)
Dept: OUTPATIENT SERVICES | Facility: HOSPITAL | Age: 24
LOS: 1 days | End: 2024-06-13
Payer: MEDICAID

## 2024-06-13 DIAGNOSIS — E04.9 NONTOXIC GOITER, UNSPECIFIED: ICD-10-CM

## 2024-06-13 PROCEDURE — 76536 US EXAM OF HEAD AND NECK: CPT | Mod: 26

## 2024-06-13 PROCEDURE — 76536 US EXAM OF HEAD AND NECK: CPT

## 2024-06-20 ENCOUNTER — EMERGENCY (EMERGENCY)
Facility: HOSPITAL | Age: 24
LOS: 1 days | Discharge: HOME | End: 2024-06-20
Attending: STUDENT IN AN ORGANIZED HEALTH CARE EDUCATION/TRAINING PROGRAM | Admitting: STUDENT IN AN ORGANIZED HEALTH CARE EDUCATION/TRAINING PROGRAM
Payer: MEDICAID

## 2024-06-20 VITALS
DIASTOLIC BLOOD PRESSURE: 68 MMHG | HEART RATE: 100 BPM | OXYGEN SATURATION: 100 % | RESPIRATION RATE: 16 BRPM | TEMPERATURE: 98 F | SYSTOLIC BLOOD PRESSURE: 118 MMHG

## 2024-06-20 PROCEDURE — 99284 EMERGENCY DEPT VISIT MOD MDM: CPT

## 2024-06-20 PROCEDURE — 93010 ELECTROCARDIOGRAM REPORT: CPT

## 2024-06-20 RX ORDER — ALBUTEROL 90 UG/1
1 AEROSOL, METERED ORAL ONCE
Refills: 0 | Status: COMPLETED | OUTPATIENT
Start: 2024-06-20 | End: 2024-06-20

## 2024-06-20 RX ORDER — ALBUTEROL 90 UG/1
2.5 AEROSOL, METERED ORAL ONCE
Refills: 0 | Status: COMPLETED | OUTPATIENT
Start: 2024-06-20 | End: 2024-06-20

## 2024-06-20 RX ORDER — ACETAMINOPHEN 500 MG
975 TABLET ORAL ONCE
Refills: 0 | Status: COMPLETED | OUTPATIENT
Start: 2024-06-20 | End: 2024-06-20

## 2024-06-20 RX ADMIN — Medication 975 MILLIGRAM(S): at 19:48

## 2024-06-20 RX ADMIN — ALBUTEROL 1 PUFF(S): 90 AEROSOL, METERED ORAL at 20:07

## 2024-06-20 NOTE — ED PROVIDER NOTE - PROGRESS NOTE DETAILS
Louis Caal DO (PGY1)  VSS. Patient feeling symptomatic better s/p albuterol nebulizer treatment.  Advised patient follow-up PCP within the week for evaluation of symptoms.  Medically cleared for discharge. Time was taken to answer all of patients questions and concerns. Return precaution instructions were given and patient understands and feels comfortable with disposition. Louis Caal DO (PGY1)  VSS. Patient feeling symptomatic better s/p albuterol treatment.  Advised patient follow-up PCP within the week for evaluation of symptoms.  Medically cleared for discharge. Time was taken to answer all of patients questions and concerns. Return precaution instructions were given and patient understands and feels comfortable with disposition.

## 2024-06-20 NOTE — ED PROVIDER NOTE - PHYSICAL EXAMINATION
Gen: NAD, non-toxic appearing  Head: normal appearing  HEENT: normal conjunctiva, oral mucosa dry, no laryngeal edema, no nasal soot or singed nasal hair   Lung: no respiratory distress, speaking in full sentences, CTA b/l     CV: regular rate and rhythm, no murmurs  Abd: soft, non distended, non tender   MSK: no visible deformities  Neuro: No focal deficits, AAOx3  Skin: Warm  Psych: normal affect
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
## 2024-06-20 NOTE — ED PROVIDER NOTE - NS ED ATTENDING STATEMENT MOD
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
## 2024-06-20 NOTE — ED ADULT TRIAGE NOTE - RESPIRATORY RATE (BREATHS/MIN)
Patient Instructions   Constipation     (1) Clean Out:  Take 1 Bisacodyl tablet. *optional*  Mix  8 Capfuls of  Miralax in 40oz Clear Liquids, Powerade or Gatorade. Avoid red colored liquids.  Drink 8 oz every 20-30 minutes until solution is gone (usually takes 4-5 hours).  Drink plenty of fluids between and after doses. Repeat as needed up to two more times the next two days until stool is clear (chicken broth like appearance).     (2) Maintenance (daily):   Miralax 1 capful daily, mixed in 6-8 oz liquid, at bedtime.   Adjust as needed to have at least one soft stool per day.     (3) Rescue:   1 Bisacodyl tablet if no stool by the end of the day and 2 capfuls of Miralax in 10-12 ounces of liquid..      REPEAT CLEAN OUT EVERY MONTH.       Take time after meals to use the restroom.       Feel free to watch the [employer] Video: \"The Poo In You\" for more information on constipation.      
16

## 2024-06-20 NOTE — ED PROVIDER NOTE - NSFOLLOWUPINSTRUCTIONS_ED_ALL_ED_FT
Please schedule follow-up appointment with PCP within the week for further evaluation of symptoms.    Shortness of breath    Shortness of breath (dyspnea) means you have trouble breathing and could indicate a medical problem. Causes include lung disease, heart disease, low amount of red blood cells (anemia), poor physical fitness, being overweight, smoking, etc. Your health care provider today may not be able to find a cause for your shortness of breath after your exam. In this case, it is important to have a follow-up exam with your primary care physician as instructed. If medicines were prescribed, take them as directed for the full length of time directed. Refrain from tobacco products.    SEEK IMMEDIATE MEDICAL CARE IF YOU HAVE ANY OF THE FOLLOWING SYMPTOMS: worsening shortness of breath, chest pain, back pain, abdominal pain, fever, coughing up blood, lightheadedness/dizziness.
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
## 2024-06-24 ENCOUNTER — OUTPATIENT (OUTPATIENT)
Dept: OUTPATIENT SERVICES | Facility: HOSPITAL | Age: 24
LOS: 1 days | End: 2024-06-24

## 2024-06-24 ENCOUNTER — APPOINTMENT (OUTPATIENT)
Age: 24
End: 2024-06-24
Payer: COMMERCIAL

## 2024-06-24 ENCOUNTER — APPOINTMENT (OUTPATIENT)
Dept: INTERNAL MEDICINE | Facility: CLINIC | Age: 24
End: 2024-06-24

## 2024-06-24 PROCEDURE — D2391: CPT

## 2024-06-25 DIAGNOSIS — E05.80 OTHER THYROTOXICOSIS WITHOUT THYROTOXIC CRISIS OR STORM: ICD-10-CM

## 2024-06-27 ENCOUNTER — NON-APPOINTMENT (OUTPATIENT)
Age: 24
End: 2024-06-27

## 2024-06-27 LAB
T3 SERPL-MCNC: 237 NG/DL
T3FREE SERPL-MCNC: 6.99 PG/ML
T4 FREE SERPL-MCNC: 2 NG/DL
THYROGLOB AB SERPL-ACNC: <20 IU/ML
THYROPEROXIDASE AB SERPL IA-ACNC: 265 IU/ML
TSH SERPL-ACNC: <0.01 UIU/ML
TSI ACT/NOR SER: 2.03 IU/L

## 2024-07-10 ENCOUNTER — OUTPATIENT (OUTPATIENT)
Dept: OUTPATIENT SERVICES | Facility: HOSPITAL | Age: 24
LOS: 1 days | End: 2024-07-10

## 2024-07-10 ENCOUNTER — APPOINTMENT (OUTPATIENT)
Dept: INTERNAL MEDICINE | Facility: CLINIC | Age: 24
End: 2024-07-10
Payer: MEDICAID

## 2024-07-10 VITALS
OXYGEN SATURATION: 100 % | SYSTOLIC BLOOD PRESSURE: 124 MMHG | BODY MASS INDEX: 30.55 KG/M2 | HEIGHT: 62 IN | DIASTOLIC BLOOD PRESSURE: 78 MMHG | HEART RATE: 87 BPM | WEIGHT: 166 LBS

## 2024-07-10 DIAGNOSIS — E05.90 THYROTOXICOSIS, UNSPECIFIED W/OUT THYROTOXIC CRISIS OR STORM: ICD-10-CM

## 2024-07-10 DIAGNOSIS — E55.9 VITAMIN D DEFICIENCY, UNSPECIFIED: ICD-10-CM

## 2024-07-10 PROCEDURE — 99213 OFFICE O/P EST LOW 20 MIN: CPT

## 2024-07-10 RX ORDER — METHIMAZOLE 5 MG/1
5 TABLET ORAL DAILY
Qty: 60 | Refills: 0 | Status: ACTIVE | COMMUNITY
Start: 2024-07-10 | End: 1900-01-01

## 2024-07-11 DIAGNOSIS — E55.9 VITAMIN D DEFICIENCY, UNSPECIFIED: ICD-10-CM

## 2024-07-11 DIAGNOSIS — E05.90 THYROTOXICOSIS, UNSPECIFIED WITHOUT THYROTOXIC CRISIS OR STORM: ICD-10-CM

## 2024-07-11 LAB
HCG SERPL-MCNC: <1 MIU/ML
TSH RECEPTOR AB: 3.03 IU/L

## 2024-07-24 ENCOUNTER — APPOINTMENT (OUTPATIENT)
Age: 24
End: 2024-07-24
Payer: COMMERCIAL

## 2024-07-24 PROCEDURE — D2392: CPT

## 2024-08-27 ENCOUNTER — APPOINTMENT (OUTPATIENT)
Dept: ENDOCRINOLOGY | Facility: CLINIC | Age: 24
End: 2024-08-27
Payer: MEDICAID

## 2024-08-27 ENCOUNTER — OUTPATIENT (OUTPATIENT)
Dept: OUTPATIENT SERVICES | Facility: HOSPITAL | Age: 24
LOS: 1 days | End: 2024-08-27

## 2024-08-27 VITALS
HEART RATE: 90 BPM | OXYGEN SATURATION: 100 % | SYSTOLIC BLOOD PRESSURE: 129 MMHG | HEIGHT: 62 IN | WEIGHT: 170 LBS | DIASTOLIC BLOOD PRESSURE: 83 MMHG | BODY MASS INDEX: 31.28 KG/M2

## 2024-08-27 DIAGNOSIS — R73.03 PREDIABETES.: ICD-10-CM

## 2024-08-27 DIAGNOSIS — E05.90 THYROTOXICOSIS, UNSPECIFIED W/OUT THYROTOXIC CRISIS OR STORM: ICD-10-CM

## 2024-08-27 DIAGNOSIS — E04.2 NONTOXIC MULTINODULAR GOITER: ICD-10-CM

## 2024-08-27 PROCEDURE — 99205 OFFICE O/P NEW HI 60 MIN: CPT

## 2024-08-28 PROBLEM — E04.2 GOITER, NONTOXIC, MULTINODULAR: Status: ACTIVE | Noted: 2024-08-28

## 2024-08-28 PROBLEM — R73.03 PREDIABETES: Status: ACTIVE | Noted: 2024-08-28

## 2024-08-28 NOTE — REVIEW OF SYSTEMS
[Recent Weight Gain (___ Lbs)] : recent weight gain: [unfilled] lbs [Recent Weight Loss (___ Lbs)] : recent weight loss: [unfilled] lbs [Diarrhea] : diarrhea [Heat Intolerance] : heat intolerance [Easy Bruising] : a tendency for easy bruising [Fatigue] : no fatigue [Decreased Appetite] : appetite not decreased [Dry Eyes] : no dryness [Blurred Vision] : no blurred vision [Dysphagia] : no dysphagia [Dysphonia] : no dysphonia [Neck Pain] : no neck pain [Chest Pain] : no chest pain [Palpitations] : no palpitations [Shortness Of Breath] : no shortness of breath [Cough] : no cough [Nausea] : no nausea [Constipation] : no constipation [Abdominal Pain] : no abdominal pain [Vomiting] : no vomiting [Polyuria] : no polyuria [Irregular Menses] : regular menses [Hair Loss] : no hair loss [Confusion] : no confusion [Tremors] : no tremors [Pain/Numbness of Digits] : no pain/numbness of digits [Polydipsia] : no polydipsia [Cold Intolerance] : no cold intolerance [Easy Bleeding] : no ~M tendency for easy bleeding

## 2024-08-28 NOTE — END OF VISIT
[] : Fellow [FreeTextEntry3] : 23F presenting with history of graves disease and MNG. patient needs repeat TFTs and will titrate methimazole accordingly.  Additionally, please note that patient had thyroid US which shows MNG, the largest of which is 1.5mg on the right and 1.0mg on the left. Consider doing FNA of dominant TR4 nodule  [Time Spent: ___ minutes] : I have spent [unfilled] minutes of time on the encounter which excludes teaching and separately reported services.

## 2024-08-28 NOTE — ASSESSMENT
[FreeTextEntry1] : 23F with PMH anxiety/depression, asthma controlled on PRN albuterol, prediabetes, abnormal TFTs presenting to establish care for hyperthyroidism.   #Hyperthyroidism #Graves disease - 1/2024 TSH 0.30, FT4 1.2 - 5/2024 TSH <0.01, FT4 2.3 - 6/2024 TSH <0.01, FT4 2.0, TT3 237, , Tg Ab negative - TPO Ab 265, TSI 2.03, TRAb 3.03  - US thyroid showed diffusely heterogeneous thyroid gland c/f thyroiditis; bilateral hypoechoic nodules - Started on methimazole 5 mg daily in July 2024 by Medicine team with moderate improvement in sxs  PLAN: - c/w methimazole 5 mg daily for now - repeat TSH, FT4, TT3 - will obtain CMP, WBC today - can consider REYNAGA vs. surgery in future pending response to methimazole - RTC in 2 months  #Prediabetes - a1c 5.7% 5/2024 - discussed lifestyle modifications - patient reports she is currently portioning foods and walking often for exercise - repeat A1c next year   #MNG - Right Lobe: 4.5 cm x 1.2 cm x 1.4 cm. Diffusely heterogeneous with multiple hypoechoic areas. There is a hypoechoic nodule measuring 1.5 x 0.7 x 0.5 cm in the midpole (TI-RAD 4). Left Lobe: 3.4 cm x 1.0 cm x 1.6 cm. Diffusely heterogeneous multiple hypoechoic areas. There is a hypoechoic nodule posteriorly in the midpole measuring 1.0 x 0.8 x 0.4 cm (TI-RAD 4). - consider FNA of the dominant nodule   Case seen and discussed with Dr. Faustino Valera MD PGY4 Endocrinology Fellow

## 2024-08-28 NOTE — HISTORY OF PRESENT ILLNESS
[FreeTextEntry1] : 23F with PMH anxiety/depression, asthma controlled on PRN albuterol, prediabetes, hyperthyroidism presenting to establish care for hyperthryoidism.   Patient initially had eConsult with endocrine for abnormal TFTs (1/2024 TSH 0.30, FT4 1.2; 5/2024 TSH <0.01, FT4 2.3); US thyroid showed diffusely heterogeneous thyroid gland, likely secondary to underlying thyroiditis, bilateral hypoechoic nodules. She was followed up with Medicine for further evaluation and started on methimazole 5 mg daily based on labs and presentation at that time. She was later found to have positive antibodies: TPO Ab 265, TSI 2.03, TRAb 3.03.   Patient reports weight loss from 210 lb to 167 lb initially but states it was partly intentional and that she had been physically active and eating better. Also has intermittent palpitations and diarrhea. Denies neck swelling or pain. Denies irregular menses. No family history of Grave's or other autoimmune disease. She was told to have a trait, she is going to schedule hematology. Denies bone or muscle pains. Reports heat intolerance, diarrhea, palpitations, diaphoresis. Denies dysphagia, dysphonia, palpitations, tremor, cold intolerance, constipation, fatigue, hair loss, dry skin, brittle nails. LMP 8/22/24, no heavy bleeding.  Has no know h/o head/neck radiation  No h/o FNA FHx - diabetes and sickle cell disease  On methimazole 5 mg daily, started taking in late July 2024.  SHx - denies tobacco use, says she had previously been drinking 2 glasses of wine daily for a long time but has recently stopped

## 2024-08-28 NOTE — REVIEW OF SYSTEMS
[Recent Weight Gain (___ Lbs)] : recent weight gain: [unfilled] lbs [Recent Weight Loss (___ Lbs)] : recent weight loss: [unfilled] lbs [Diarrhea] : diarrhea [Heat Intolerance] : heat intolerance [Easy Bruising] : a tendency for easy bruising [Fatigue] : no fatigue [Decreased Appetite] : appetite not decreased [Dry Eyes] : no dryness [Blurred Vision] : no blurred vision [Dysphagia] : no dysphagia [Dysphonia] : no dysphonia [Neck Pain] : no neck pain [Chest Pain] : no chest pain [Palpitations] : no palpitations [Cough] : no cough [Shortness Of Breath] : no shortness of breath [Nausea] : no nausea [Constipation] : no constipation [Abdominal Pain] : no abdominal pain [Vomiting] : no vomiting [Polyuria] : no polyuria [Irregular Menses] : regular menses [Hair Loss] : no hair loss [Confusion] : no confusion [Tremors] : no tremors [Pain/Numbness of Digits] : no pain/numbness of digits [Polydipsia] : no polydipsia [Cold Intolerance] : no cold intolerance [Easy Bleeding] : no ~M tendency for easy bleeding

## 2024-08-28 NOTE — PHYSICAL EXAM
[Alert] : alert [Well Nourished] : well nourished [Healthy Appearance] : healthy appearance [No Acute Distress] : no acute distress [Normal Sclera/Conjunctiva] : normal sclera/conjunctiva [EOMI] : extra ocular movement intact [No Proptosis] : no proptosis [No Lid Lag] : no lid lag [Normal Hearing] : hearing was normal [Thyroid Not Enlarged] : the thyroid was not enlarged [No Thyroid Nodules] : no palpable thyroid nodules [Clear to Auscultation] : lungs were clear to auscultation bilaterally [Normal S1, S2] : normal S1 and S2 [Regular Rhythm] : with a regular rhythm [No Edema] : no peripheral edema [Pedal Pulses Normal] : the pedal pulses are present [Normal Bowel Sounds] : normal bowel sounds [Not Tender] : non-tender [Not Distended] : not distended [Soft] : abdomen soft [No Stigmata of Cushings Syndrome] : no stigmata of Cushings Syndrome [Oriented x3] : oriented to person, place, and time [Normal Insight/Judgement] : insight and judgment were intact [de-identified] : John

## 2024-08-28 NOTE — PHYSICAL EXAM
[Alert] : alert [Well Nourished] : well nourished [Healthy Appearance] : healthy appearance [No Acute Distress] : no acute distress [Normal Sclera/Conjunctiva] : normal sclera/conjunctiva [EOMI] : extra ocular movement intact [No Proptosis] : no proptosis [No Lid Lag] : no lid lag [Normal Hearing] : hearing was normal [Thyroid Not Enlarged] : the thyroid was not enlarged [No Thyroid Nodules] : no palpable thyroid nodules [Clear to Auscultation] : lungs were clear to auscultation bilaterally [Normal S1, S2] : normal S1 and S2 [Regular Rhythm] : with a regular rhythm [No Edema] : no peripheral edema [Pedal Pulses Normal] : the pedal pulses are present [Normal Bowel Sounds] : normal bowel sounds [Not Tender] : non-tender [Not Distended] : not distended [Soft] : abdomen soft [No Stigmata of Cushings Syndrome] : no stigmata of Cushings Syndrome [Oriented x3] : oriented to person, place, and time [Normal Insight/Judgement] : insight and judgment were intact [de-identified] : John

## 2024-08-29 DIAGNOSIS — E05.90 THYROTOXICOSIS, UNSPECIFIED WITHOUT THYROTOXIC CRISIS OR STORM: ICD-10-CM

## 2024-08-29 DIAGNOSIS — E04.2 NONTOXIC MULTINODULAR GOITER: ICD-10-CM

## 2024-08-29 DIAGNOSIS — R73.03 PREDIABETES: ICD-10-CM

## 2024-08-29 LAB
ALBUMIN SERPL ELPH-MCNC: 4.4 G/DL
ALP BLD-CCNC: 108 U/L
ALT SERPL-CCNC: 13 U/L
ANION GAP SERPL CALC-SCNC: 9 MMOL/L
AST SERPL-CCNC: 14 U/L
BILIRUB SERPL-MCNC: <0.2 MG/DL
BUN SERPL-MCNC: 9 MG/DL
CALCIUM SERPL-MCNC: 10.1 MG/DL
CHLORIDE SERPL-SCNC: 102 MMOL/L
CO2 SERPL-SCNC: 26 MMOL/L
CREAT SERPL-MCNC: 0.81 MG/DL
EGFR: 105 ML/MIN/1.73M2
GLUCOSE SERPL-MCNC: 96 MG/DL
POTASSIUM SERPL-SCNC: 5.1 MMOL/L
PROT SERPL-MCNC: 7.9 G/DL
SODIUM SERPL-SCNC: 137 MMOL/L
T3 SERPL-MCNC: 227 NG/DL
T4 FREE SERPL-MCNC: 1.8 NG/DL
TSH SERPL-ACNC: <0.01 UIU/ML
WBC # FLD AUTO: 4.63 K/UL

## 2024-09-18 ENCOUNTER — OUTPATIENT (OUTPATIENT)
Dept: OUTPATIENT SERVICES | Facility: HOSPITAL | Age: 24
LOS: 1 days | End: 2024-09-18

## 2024-09-18 ENCOUNTER — APPOINTMENT (OUTPATIENT)
Dept: INTERNAL MEDICINE | Facility: CLINIC | Age: 24
End: 2024-09-18
Payer: MEDICAID

## 2024-09-18 VITALS
RESPIRATION RATE: 16 BRPM | BODY MASS INDEX: 31.65 KG/M2 | WEIGHT: 172 LBS | OXYGEN SATURATION: 97 % | SYSTOLIC BLOOD PRESSURE: 126 MMHG | HEART RATE: 85 BPM | DIASTOLIC BLOOD PRESSURE: 70 MMHG | HEIGHT: 62 IN

## 2024-09-18 DIAGNOSIS — H61.23 IMPACTED CERUMEN, BILATERAL: ICD-10-CM

## 2024-09-18 DIAGNOSIS — Z87.39 PERSONAL HISTORY OF OTHER DISEASES OF THE MUSCULOSKELETAL SYSTEM AND CONNECTIVE TISSUE: ICD-10-CM

## 2024-09-18 DIAGNOSIS — E66.9 OBESITY, UNSPECIFIED: ICD-10-CM

## 2024-09-18 DIAGNOSIS — Z11.3 ENCOUNTER FOR SCREENING FOR INFECTIONS WITH A PREDOMINANTLY SEXUAL MODE OF TRANSMISSION: ICD-10-CM

## 2024-09-18 DIAGNOSIS — Z23 ENCOUNTER FOR IMMUNIZATION: ICD-10-CM

## 2024-09-18 DIAGNOSIS — Z00.00 ENCOUNTER FOR GENERAL ADULT MEDICAL EXAMINATION W/OUT ABNORMAL FINDINGS: ICD-10-CM

## 2024-09-18 DIAGNOSIS — Z86.79 PERSONAL HISTORY OF OTHER DISEASES OF THE CIRCULATORY SYSTEM: ICD-10-CM

## 2024-09-18 DIAGNOSIS — Z86.39 PERSONAL HISTORY OF OTHER ENDOCRINE, NUTRITIONAL AND METABOLIC DISEASE: ICD-10-CM

## 2024-09-18 DIAGNOSIS — E05.90 THYROTOXICOSIS, UNSPECIFIED W/OUT THYROTOXIC CRISIS OR STORM: ICD-10-CM

## 2024-09-18 DIAGNOSIS — R73.03 PREDIABETES.: ICD-10-CM

## 2024-09-18 DIAGNOSIS — R79.89 OTHER SPECIFIED ABNORMAL FINDINGS OF BLOOD CHEMISTRY: ICD-10-CM

## 2024-09-18 DIAGNOSIS — F41.9 ANXIETY DISORDER, UNSPECIFIED: ICD-10-CM

## 2024-09-18 DIAGNOSIS — Z02.89 ENCOUNTER FOR OTHER ADMINISTRATIVE EXAMINATIONS: ICD-10-CM

## 2024-09-18 DIAGNOSIS — Z87.898 PERSONAL HISTORY OF OTHER SPECIFIED CONDITIONS: ICD-10-CM

## 2024-09-18 DIAGNOSIS — Z86.69 PERSONAL HISTORY OF OTHER DISEASES OF THE NERVOUS SYSTEM AND SENSE ORGANS: ICD-10-CM

## 2024-09-18 DIAGNOSIS — Z87.42 PERSONAL HISTORY OF OTHER DISEASES OF THE FEMALE GENITAL TRACT: ICD-10-CM

## 2024-09-18 PROCEDURE — 99213 OFFICE O/P EST LOW 20 MIN: CPT

## 2024-09-18 NOTE — PHYSICAL EXAM
[No Acute Distress] : no acute distress [Well Nourished] : well nourished [Normal Sclera/Conjunctiva] : normal sclera/conjunctiva [Normal Outer Ear/Nose] : the outer ears and nose were normal in appearance [No Respiratory Distress] : no respiratory distress  [No Accessory Muscle Use] : no accessory muscle use [Clear to Auscultation] : lungs were clear to auscultation bilaterally [Normal Rate] : normal rate  [Regular Rhythm] : with a regular rhythm [Normal S1, S2] : normal S1 and S2 [No Murmur] : no murmur heard [No Edema] : there was no peripheral edema [Soft] : abdomen soft [Non Tender] : non-tender [Non-distended] : non-distended [Speech Grossly Normal] : speech grossly normal [Memory Grossly Normal] : memory grossly normal [Normal Mood] : the mood was normal [Normal Insight/Judgement] : insight and judgment were intact

## 2024-09-19 ENCOUNTER — NON-APPOINTMENT (OUTPATIENT)
Age: 24
End: 2024-09-19

## 2024-09-19 NOTE — ASSESSMENT
[FreeTextEntry1] : 23F with history of anxiety/depression and asthma controlled on PRN albuterol presenting for follow up.  #HCM - offered flu shot & educated importance iso asthma, patient declined - offered pap smear but patient declined, revisit next visit  Case discussed with Dr. Feng RTC in 5/2025 for CPE or earlier PRN  Terri Matthews PGY-3, [employer]

## 2024-09-19 NOTE — REVIEW OF SYSTEMS
[Fever] : no fever [Chills] : no chills [Sore Throat] : no sore throat [Chest Pain] : no chest pain [Palpitations] : no palpitations [Shortness Of Breath] : no shortness of breath [Cough] : no cough [Abdominal Pain] : no abdominal pain [Nausea] : no nausea [Diarrhea] : diarrhea [Vomiting] : no vomiting [Dysuria] : no dysuria [Hematuria] : no hematuria

## 2024-09-19 NOTE — HISTORY OF PRESENT ILLNESS
[FreeTextEntry1] : follow up [de-identified] : 23F with Grave's disease, anxiety/depression and asthma presenting for follow up. Patient states she has been doing well since last visit. Went on a vacation trip with her friends. 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 She has been taking methimazole since last visit and believes is helping with her symptoms. Her diarrhea and palpitations improved. Her weight stabilized. She is a little frustrated but is planning on working out at the gym this week. She has not been eating so healthy, mostly eats takeout foods. She is aware of importance of dietary changes for losing weight. Had sinus symptoms recently, initially thought was due to the flu but improved with flonase and allergy pills. Did not require extra doses of albuterol. Denies chest pain, GI or  symptoms. Currently sexually active but is aware of importance of contraception while taking methimazole. She continues to see her therapist for anxiety/depression, her next appointment is tomorrow.

## 2024-09-19 NOTE — HISTORY OF PRESENT ILLNESS
[FreeTextEntry1] : follow up [de-identified] : 23F with Grave's disease, anxiety/depression and asthma presenting for follow up. Patient states she has been doing well since last visit. Went on a vacation trip with her friends. 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 She has been taking methimazole since last visit and believes is helping with her symptoms. Her diarrhea and palpitations improved. Her weight stabilized. She is a little frustrated but is planning on working out at the gym this week. She has not been eating so healthy, mostly eats takeout foods. She is aware of importance of dietary changes for losing weight. Had sinus symptoms recently, initially thought was due to the flu but improved with flonase and allergy pills. Did not require extra doses of albuterol. Denies chest pain, GI or  symptoms. Currently sexually active but is aware of importance of contraception while taking methimazole. She continues to see her therapist for anxiety/depression, her next appointment is tomorrow.

## 2024-09-20 DIAGNOSIS — R73.03 PREDIABETES: ICD-10-CM

## 2024-09-20 DIAGNOSIS — E05.90 THYROTOXICOSIS, UNSPECIFIED WITHOUT THYROTOXIC CRISIS OR STORM: ICD-10-CM

## 2024-09-20 DIAGNOSIS — E66.9 OBESITY, UNSPECIFIED: ICD-10-CM

## 2024-09-20 DIAGNOSIS — F41.9 ANXIETY DISORDER, UNSPECIFIED: ICD-10-CM

## 2024-10-29 ENCOUNTER — OUTPATIENT (OUTPATIENT)
Dept: OUTPATIENT SERVICES | Facility: HOSPITAL | Age: 24
LOS: 1 days | End: 2024-10-29

## 2024-10-29 ENCOUNTER — APPOINTMENT (OUTPATIENT)
Dept: ENDOCRINOLOGY | Facility: CLINIC | Age: 24
End: 2024-10-29
Payer: MEDICAID

## 2024-10-29 VITALS
DIASTOLIC BLOOD PRESSURE: 70 MMHG | OXYGEN SATURATION: 99 % | BODY MASS INDEX: 30.55 KG/M2 | WEIGHT: 166 LBS | SYSTOLIC BLOOD PRESSURE: 110 MMHG | HEIGHT: 62 IN | HEART RATE: 88 BPM

## 2024-10-29 DIAGNOSIS — E05.90 THYROTOXICOSIS, UNSPECIFIED W/OUT THYROTOXIC CRISIS OR STORM: ICD-10-CM

## 2024-10-29 DIAGNOSIS — E04.2 NONTOXIC MULTINODULAR GOITER: ICD-10-CM

## 2024-10-29 PROCEDURE — 99214 OFFICE O/P EST MOD 30 MIN: CPT

## 2024-10-29 PROCEDURE — G2211 COMPLEX E/M VISIT ADD ON: CPT | Mod: NC

## 2024-10-30 DIAGNOSIS — E05.90 THYROTOXICOSIS, UNSPECIFIED WITHOUT THYROTOXIC CRISIS OR STORM: ICD-10-CM

## 2024-10-30 DIAGNOSIS — E04.2 NONTOXIC MULTINODULAR GOITER: ICD-10-CM

## 2024-10-30 LAB
T3 SERPL-MCNC: 256 NG/DL
T4 FREE SERPL-MCNC: 2.2 NG/DL

## 2024-10-31 LAB — TSH SERPL-ACNC: <0.01 UIU/ML

## 2024-12-06 ENCOUNTER — RESULT CHARGE (OUTPATIENT)
Age: 24
End: 2024-12-06

## 2024-12-06 ENCOUNTER — APPOINTMENT (OUTPATIENT)
Age: 24
End: 2024-12-06

## 2024-12-06 ENCOUNTER — OUTPATIENT (OUTPATIENT)
Dept: OUTPATIENT SERVICES | Facility: HOSPITAL | Age: 24
LOS: 1 days | End: 2024-12-06

## 2024-12-06 ENCOUNTER — APPOINTMENT (OUTPATIENT)
Dept: INTERNAL MEDICINE | Facility: CLINIC | Age: 24
End: 2024-12-06
Payer: MEDICAID

## 2024-12-06 VITALS
WEIGHT: 165 LBS | SYSTOLIC BLOOD PRESSURE: 127 MMHG | TEMPERATURE: 98.1 F | BODY MASS INDEX: 30.36 KG/M2 | HEART RATE: 95 BPM | HEIGHT: 62 IN | OXYGEN SATURATION: 99 % | RESPIRATION RATE: 16 BRPM | DIASTOLIC BLOOD PRESSURE: 77 MMHG

## 2024-12-06 DIAGNOSIS — Z12.4 ENCOUNTER FOR SCREENING FOR MALIGNANT NEOPLASM OF CERVIX: ICD-10-CM

## 2024-12-06 DIAGNOSIS — J45.990 EXERCISE INDUCED BRONCHOSPASM: ICD-10-CM

## 2024-12-06 DIAGNOSIS — J45.901 UNSPECIFIED ASTHMA WITH (ACUTE) EXACERBATION: ICD-10-CM

## 2024-12-06 DIAGNOSIS — J06.9 ACUTE UPPER RESPIRATORY INFECTION, UNSPECIFIED: ICD-10-CM

## 2024-12-06 LAB
FLUAV SPEC QL CULT: NEGATIVE
FLUBV AG SPEC QL IA: NEGATIVE

## 2024-12-06 PROCEDURE — 99213 OFFICE O/P EST LOW 20 MIN: CPT

## 2024-12-06 RX ORDER — BUDESONIDE AND FORMOTEROL FUMARATE DIHYDRATE 160; 4.5 UG/1; UG/1
160-4.5 AEROSOL RESPIRATORY (INHALATION) TWICE DAILY
Qty: 1 | Refills: 3 | Status: ACTIVE | COMMUNITY
Start: 2024-12-06 | End: 1900-01-01

## 2024-12-06 RX ORDER — BENZONATATE 100 MG/1
100 CAPSULE ORAL 3 TIMES DAILY
Qty: 21 | Refills: 0 | Status: COMPLETED | COMMUNITY
Start: 2024-12-06 | End: 2024-12-13

## 2024-12-09 DIAGNOSIS — J06.9 ACUTE UPPER RESPIRATORY INFECTION, UNSPECIFIED: ICD-10-CM

## 2024-12-09 DIAGNOSIS — J45.990 EXERCISE INDUCED BRONCHOSPASM: ICD-10-CM

## 2024-12-09 DIAGNOSIS — J45.901 UNSPECIFIED ASTHMA WITH (ACUTE) EXACERBATION: ICD-10-CM

## 2024-12-09 DIAGNOSIS — Z12.4 ENCOUNTER FOR SCREENING FOR MALIGNANT NEOPLASM OF CERVIX: ICD-10-CM

## 2024-12-16 ENCOUNTER — EMERGENCY (EMERGENCY)
Facility: HOSPITAL | Age: 24
LOS: 1 days | Discharge: HOME | End: 2024-12-16
Attending: STUDENT IN AN ORGANIZED HEALTH CARE EDUCATION/TRAINING PROGRAM | Admitting: STUDENT IN AN ORGANIZED HEALTH CARE EDUCATION/TRAINING PROGRAM
Payer: MEDICAID

## 2024-12-16 VITALS
DIASTOLIC BLOOD PRESSURE: 75 MMHG | OXYGEN SATURATION: 99 % | WEIGHT: 162.04 LBS | SYSTOLIC BLOOD PRESSURE: 121 MMHG | RESPIRATION RATE: 18 BRPM | TEMPERATURE: 98 F | HEART RATE: 87 BPM

## 2024-12-16 PROCEDURE — 99284 EMERGENCY DEPT VISIT MOD MDM: CPT | Mod: 25

## 2024-12-16 RX ORDER — TETRACAINE HYDROCHLORIDE 5 MG/ML
1 SOLUTION OPHTHALMIC ONCE
Refills: 0 | Status: COMPLETED | OUTPATIENT
Start: 2024-12-16 | End: 2024-12-16

## 2024-12-16 RX ADMIN — TETRACAINE HYDROCHLORIDE 1 DROP(S): 5 SOLUTION OPHTHALMIC at 09:06

## 2024-12-16 NOTE — ED ADULT TRIAGE NOTE - CHIEF COMPLAINT QUOTE
Patient c/o left eye redness, itchiness and discharge x 1 day. Denies fevers and chills. hx. asthma.

## 2024-12-16 NOTE — ED PROVIDER NOTE - NSFOLLOWUPINSTRUCTIONS_ED_ALL_ED_FT
1) Please follow up with your Primary Care Provider in 24-48 hours  2) Seek immediate medical care for any new or returning symptoms including but not limited severe pain, high fevers, vision loss  3) Please use artificial tears every 6 hours

## 2024-12-16 NOTE — ED PROVIDER NOTE - PATIENT PORTAL LINK FT
You can access the FollowMyHealth Patient Portal offered by [facility] by registering at the following website: [email or web address]. By joining [facility]’s FollowMyHealth portal, you will also be able to view your health information using other applications (apps) compatible with our system.

## 2024-12-16 NOTE — ED ADULT TRIAGE NOTE - NS ED NURSE BANDS TYPE
Efrain   1942 05/13/19    Chief Complaint   Patient presents with    6 Month Follow-Up    Other     Patient states she was in the hospital about 2 weeks ago and is still feeling weak and Bp is still high           Patient here for 6 months f/u regarding her DM II, hyperlipidemia, HTN, COPD, and hypothyroidism, patient was in the hospital for pneumonia, and still feels fatigue and tired and low energy. Denies cough, chest pain or SOB, patient also has some headache and her blood pressure going high little, denies dizziness, denies chest pain or SOB. The patient is taking hypertensive medications compliantly without side effects. Denies chest pain, dyspnea, edema, or TIA's. Past Medical History:   Diagnosis Date    Abnormal nuclear stress test 4/08;7/07 4/08-EF=67%,Mild->Mod.isch. LAD;7/07-EF=70%,Suggests poss. Mild Ant.wall ischemia.  Cardiac arrest ([facility] 2008    Diabetes mellitus ([facility]     H/O cardiac catheterization 4/08    No signif.CAD.    H/O cardiovascular stress test 04/11/08    mild to mod ischemia in the LAD region, abnormal study, EF 67%, patient developed mild chest pain and throat tightness    H/O cardiovascular stress test 5/3/2016    lexiscan-moderate ischemia apical,RF66%    H/O Doppler ultrasound 07/16/2007    CAROTID DOPLER- intimal thickening but no significant atherosclerotic plaque noted in the right or left internal carotid artery, doppler flow velocities within the right and left internal carotid artery are elevated, consistent with a mild, less than 50% stenosis    H/O echocardiogram 10/14/08    EF>55%, normal LV systolic function, normal left ventricular wall thickness, impaired LV relaxation    H/O echocardiogram 6/18/13, 10/08;07/07 6/13- EF >55% Impaired LV relaxation, Mild concentric LV hypertrophy, No sig AS, THEO underestimated. 10/08-EF=>55%,NL study;7/07-EF=>55%,Mild valv. AS.    H/O echocardiogram 5/16/2016 12/3/14    5/16 EF 55-60% normal study 12/14EF Pupils are equal, round, and reactive to light. EOM are normal. No scleral icterus. Neck: Normal range of motion. Neck supple. No thyromegaly present. Cardiovascular: Normal rate, regular rhythm and normal heart sounds. No murmur heard. Pulmonary/Chest: Effort normal and breath sounds normal. She has no wheezes. She has no rales. Musculoskeletal: Normal range of motion. She exhibits no edema. Lymphadenopathy:     She has no cervical adenopathy. Neurological: She is alert and oriented to person, place, and time. Skin: She is not diaphoretic. Psychiatric: She has a normal mood and affect. Her behavior is normal. Judgment and thought content normal.       ASSESSMENT/ PLAN:    1. Type 2 diabetes mellitus with complication, with long-term current use of insulin ([facility])  - POCT glycosylated hemoglobin (Hb A1C) went up little, patient activity is less so will wait another month    2. Essential hypertension  - in crease the lisinopril to 50 mg  - lisinopril (PRINIVIL;ZESTRIL) 10 MG tablet; Take 1 tablet by mouth daily  Dispense: 30 tablet; Refill: 3    3. Mixed hyperlipidemia  - on medication    4. Acquired hypothyroidism  - stable    5. Chronic obstructive pulmonary disease, unspecified COPD type ([facility]  - stable  - budesonide-formoterol (SYMBICORT) 80-4.5 MCG/ACT AERO; Inhale 2 puffs into the lungs 2 times daily                - Appropriateprescription are addressed. - After visit summery provided. - Questions answered and patient verbalizes understanding.  - Call for any problem, questions, or concerns. Return in about 1 month (around 6/13/2019) for AWV. Name band;

## 2024-12-16 NOTE — ED PROVIDER NOTE - CLINICAL SUMMARY MEDICAL DECISION MAKING FREE TEXT BOX
Francesco Vanessa, DO:  25 yo f No reported PMH, no PSH, PW left eye conjunctivitis.  Patient reports 1 day of symptoms, low COVID symptoms.  Reports URI-like symptoms including rhinorrhea, congestion, fatigue.  Denies F/C.  Denies vision loss, vision pain.  Denies pain with EOM.  Denies trauma.  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  Patient arrives HDS, well-appearing, PE as noted.  Do not suspect bacterial conjunctivitis.  Had extensive discussion with patient regarding symptomatic control and return precautions.  DC with strict return precautions.

## 2024-12-16 NOTE — ED PROVIDER NOTE - PHYSICAL EXAMINATION
General: well appearing, interactive, well nourished, no apparent distress, ncat  HEENT: EOMI, PERRLA, normal mucosa, normal oropharynx, no lesions on the lips or on oral mucosa, normal external ear  eye: od/os 20/20, ,mild conj injection LEFT eye  Neck: supple, no lymphadenopathy, full range of motion, no nuchal rigidity  CV: well perfused   Resp: non labored breathing   Neuro: CN II-XII grossly intact, muscle strength General: well appearing, interactive, well nourished, no apparent distress, ncat  HEENT: EOMI, PERRLA, normal mucosa, normal oropharynx, no lesions on the lips or on oral mucosa, normal external ear  eye: od/os 20/20, ,mild conj injection LEFT eye, NO fluorescein uptake left eye  Neck: supple, no lymphadenopathy, full range of motion, no nuchal rigidity  CV: well perfused   Resp: non labored breathing   Neuro: CN II-XII grossly intact, muscle strength

## 2025-01-14 ENCOUNTER — APPOINTMENT (OUTPATIENT)
Dept: ENDOCRINOLOGY | Facility: CLINIC | Age: 25
End: 2025-01-14

## 2025-02-03 ENCOUNTER — RX RENEWAL (OUTPATIENT)
Age: 25
End: 2025-02-03

## 2025-03-10 ENCOUNTER — LABORATORY RESULT (OUTPATIENT)
Age: 25
End: 2025-03-10

## 2025-03-10 ENCOUNTER — APPOINTMENT (OUTPATIENT)
Dept: INTERNAL MEDICINE | Facility: CLINIC | Age: 25
End: 2025-03-10
Payer: MEDICAID

## 2025-03-10 ENCOUNTER — OUTPATIENT (OUTPATIENT)
Dept: OUTPATIENT SERVICES | Facility: HOSPITAL | Age: 25
LOS: 1 days | End: 2025-03-10

## 2025-03-10 VITALS
BODY MASS INDEX: 31.65 KG/M2 | DIASTOLIC BLOOD PRESSURE: 80 MMHG | HEIGHT: 62 IN | WEIGHT: 172 LBS | SYSTOLIC BLOOD PRESSURE: 100 MMHG | HEART RATE: 95 BPM | OXYGEN SATURATION: 98 %

## 2025-03-10 DIAGNOSIS — E05.90 THYROTOXICOSIS, UNSPECIFIED W/OUT THYROTOXIC CRISIS OR STORM: ICD-10-CM

## 2025-03-10 DIAGNOSIS — D64.9 ANEMIA, UNSPECIFIED: ICD-10-CM

## 2025-03-10 DIAGNOSIS — Z65.9 PROBLEM RELATED TO UNSPECIFIED PSYCHOSOCIAL CIRCUMSTANCES: ICD-10-CM

## 2025-03-10 DIAGNOSIS — J45.909 UNSPECIFIED ASTHMA, UNCOMPLICATED: ICD-10-CM

## 2025-03-10 PROCEDURE — 99213 OFFICE O/P EST LOW 20 MIN: CPT

## 2025-03-10 PROCEDURE — G2211 COMPLEX E/M VISIT ADD ON: CPT | Mod: NC

## 2025-03-11 PROBLEM — J45.909 ASTHMA: Status: ACTIVE | Noted: 2025-03-11

## 2025-03-11 PROBLEM — Z65.9 OTHER SOCIAL STRESSOR: Status: ACTIVE | Noted: 2025-03-11

## 2025-03-12 DIAGNOSIS — Z65.9 PROBLEM RELATED TO UNSPECIFIED PSYCHOSOCIAL CIRCUMSTANCES: ICD-10-CM

## 2025-03-12 DIAGNOSIS — E05.90 THYROTOXICOSIS, UNSPECIFIED WITHOUT THYROTOXIC CRISIS OR STORM: ICD-10-CM

## 2025-03-12 DIAGNOSIS — D64.9 ANEMIA, UNSPECIFIED: ICD-10-CM

## 2025-03-12 DIAGNOSIS — J45.909 UNSPECIFIED ASTHMA, UNCOMPLICATED: ICD-10-CM

## 2025-03-14 LAB
BASOPHILS # BLD AUTO: 0.01 K/UL
BASOPHILS NFR BLD AUTO: 0.2 %
EOSINOPHIL # BLD AUTO: 0 K/UL
EOSINOPHIL NFR BLD AUTO: 0 %
HCT VFR BLD CALC: 34.7 %
HGB BLD-MCNC: 11.2 G/DL
IMM GRANULOCYTES NFR BLD AUTO: 0.2 %
LYMPHOCYTES # BLD AUTO: 1.96 K/UL
LYMPHOCYTES NFR BLD AUTO: 35.4 %
MAN DIFF?: NORMAL
MCHC RBC-ENTMCNC: 20.9 PG
MCHC RBC-ENTMCNC: 32.3 G/DL
MCV RBC AUTO: 64.6 FL
MONOCYTES # BLD AUTO: 0.54 K/UL
MONOCYTES NFR BLD AUTO: 9.8 %
NEUTROPHILS # BLD AUTO: 3.01 K/UL
NEUTROPHILS NFR BLD AUTO: 54.4 %
PLATELET # BLD AUTO: 366 K/UL
RBC # BLD: 5.37 M/UL
RBC # FLD: 15.8 %
T3 SERPL-MCNC: 107 NG/DL
T4 FREE SERPL-MCNC: 0.8 NG/DL
TSH SERPL-ACNC: 0.05 UIU/ML
WBC # FLD AUTO: 5.53 K/UL

## 2025-04-15 ENCOUNTER — APPOINTMENT (OUTPATIENT)
Dept: ENDOCRINOLOGY | Facility: CLINIC | Age: 25
End: 2025-04-15
Payer: MEDICAID

## 2025-04-15 ENCOUNTER — LABORATORY RESULT (OUTPATIENT)
Age: 25
End: 2025-04-15

## 2025-04-15 ENCOUNTER — OUTPATIENT (OUTPATIENT)
Dept: OUTPATIENT SERVICES | Facility: HOSPITAL | Age: 25
LOS: 1 days | End: 2025-04-15

## 2025-04-15 VITALS
DIASTOLIC BLOOD PRESSURE: 64 MMHG | BODY MASS INDEX: 31.28 KG/M2 | RESPIRATION RATE: 17 BRPM | SYSTOLIC BLOOD PRESSURE: 120 MMHG | WEIGHT: 170 LBS | HEIGHT: 62 IN | OXYGEN SATURATION: 97 % | HEART RATE: 110 BPM

## 2025-04-15 DIAGNOSIS — E05.90 THYROTOXICOSIS, UNSPECIFIED W/OUT THYROTOXIC CRISIS OR STORM: ICD-10-CM

## 2025-04-15 DIAGNOSIS — R73.03 PREDIABETES.: ICD-10-CM

## 2025-04-15 PROCEDURE — 99214 OFFICE O/P EST MOD 30 MIN: CPT | Mod: 25

## 2025-04-16 ENCOUNTER — NON-APPOINTMENT (OUTPATIENT)
Age: 25
End: 2025-04-16

## 2025-04-16 DIAGNOSIS — E05.90 THYROTOXICOSIS, UNSPECIFIED WITHOUT THYROTOXIC CRISIS OR STORM: ICD-10-CM

## 2025-04-16 DIAGNOSIS — R73.03 PREDIABETES: ICD-10-CM

## 2025-04-16 DIAGNOSIS — E05.00 THYROTOXICOSIS WITH DIFFUSE GOITER WITHOUT THYROTOXIC CRISIS OR STORM: ICD-10-CM

## 2025-04-16 LAB
ALBUMIN SERPL ELPH-MCNC: 4.3 G/DL
ALP BLD-CCNC: 134 U/L
ALT SERPL-CCNC: 11 U/L
ANION GAP SERPL CALC-SCNC: 13 MMOL/L
AST SERPL-CCNC: 17 U/L
BASOPHILS # BLD AUTO: 0 K/UL
BASOPHILS NFR BLD AUTO: 0 %
BILIRUB SERPL-MCNC: 0.2 MG/DL
BUN SERPL-MCNC: 9 MG/DL
CALCIUM SERPL-MCNC: 9.8 MG/DL
CHLORIDE SERPL-SCNC: 102 MMOL/L
CO2 SERPL-SCNC: 25 MMOL/L
CREAT SERPL-MCNC: 0.94 MG/DL
EGFRCR SERPLBLD CKD-EPI 2021: 87 ML/MIN/1.73M2
EOSINOPHIL # BLD AUTO: 0 K/UL
EOSINOPHIL NFR BLD AUTO: 0 %
GLUCOSE SERPL-MCNC: 84 MG/DL
HCT VFR BLD CALC: 39.3 %
HGB BLD-MCNC: 12.6 G/DL
IMM GRANULOCYTES NFR BLD AUTO: 0.3 %
LYMPHOCYTES # BLD AUTO: 1.06 K/UL
LYMPHOCYTES NFR BLD AUTO: 14.7 %
MAN DIFF?: NORMAL
MCHC RBC-ENTMCNC: 21.5 PG
MCHC RBC-ENTMCNC: 32.1 G/DL
MCV RBC AUTO: 67.2 FL
MONOCYTES # BLD AUTO: 0.52 K/UL
MONOCYTES NFR BLD AUTO: 7.2 %
NEUTROPHILS # BLD AUTO: 5.63 K/UL
NEUTROPHILS NFR BLD AUTO: 77.8 %
PLATELET # BLD AUTO: 338 K/UL
POTASSIUM SERPL-SCNC: 4.4 MMOL/L
PROT SERPL-MCNC: 8 G/DL
RBC # BLD: 5.85 M/UL
RBC # FLD: 15.7 %
SODIUM SERPL-SCNC: 139 MMOL/L
T3 SERPL-MCNC: 87 NG/DL
T4 FREE SERPL-MCNC: 0.6 NG/DL
TSH SERPL-ACNC: 4.76 UIU/ML
WBC # FLD AUTO: 7.23 K/UL

## 2025-04-18 ENCOUNTER — NON-APPOINTMENT (OUTPATIENT)
Age: 25
End: 2025-04-18

## 2025-04-30 ENCOUNTER — OUTPATIENT (OUTPATIENT)
Dept: OUTPATIENT SERVICES | Facility: HOSPITAL | Age: 25
LOS: 1 days | End: 2025-04-30

## 2025-04-30 ENCOUNTER — APPOINTMENT (OUTPATIENT)
Dept: INTERNAL MEDICINE | Facility: CLINIC | Age: 25
End: 2025-04-30

## 2025-04-30 VITALS
HEIGHT: 62 IN | SYSTOLIC BLOOD PRESSURE: 100 MMHG | OXYGEN SATURATION: 97 % | DIASTOLIC BLOOD PRESSURE: 56 MMHG | BODY MASS INDEX: 32.39 KG/M2 | WEIGHT: 176 LBS | HEART RATE: 86 BPM

## 2025-04-30 DIAGNOSIS — J30.2 OTHER SEASONAL ALLERGIC RHINITIS: ICD-10-CM

## 2025-04-30 DIAGNOSIS — R22.1 LOCALIZED SWELLING, MASS AND LUMP, NECK: ICD-10-CM

## 2025-04-30 PROCEDURE — 99214 OFFICE O/P EST MOD 30 MIN: CPT | Mod: GC

## 2025-04-30 RX ORDER — CETIRIZINE HYDROCHLORIDE 10 MG/1
10 TABLET, COATED ORAL
Qty: 90 | Refills: 0 | Status: ACTIVE | COMMUNITY
Start: 2025-04-30 | End: 1900-01-01

## 2025-05-09 NOTE — ED PROVIDER NOTE - MEDICAL DECISION MAKING DETAILS
Quality 130: Documentation Of Current Medications In The Medical Record: Current Medications Documented Detail Level: Detailed Quality 226: Preventive Care And Screening: Tobacco Use: Screening And Cessation Intervention: Tobacco Screening not Performed Quality 431: Preventive Care And Screening: Unhealthy Alcohol Use - Screening: Patient not screened for unhealthy alcohol use using a systematic screening method Dx is viral Gastroenteritis. Will give Zofran and PO trial. No signs of surgical abdomen. No signs of lower abdominal TTP.

## 2025-06-17 ENCOUNTER — EMERGENCY (EMERGENCY)
Facility: HOSPITAL | Age: 25
LOS: 1 days | End: 2025-06-17
Admitting: EMERGENCY MEDICINE

## 2025-06-17 VITALS
WEIGHT: 167.99 LBS | OXYGEN SATURATION: 98 % | DIASTOLIC BLOOD PRESSURE: 78 MMHG | TEMPERATURE: 98 F | SYSTOLIC BLOOD PRESSURE: 128 MMHG | HEART RATE: 83 BPM | RESPIRATION RATE: 16 BRPM

## 2025-06-17 PROCEDURE — L9991: CPT

## 2025-07-02 ENCOUNTER — NON-APPOINTMENT (OUTPATIENT)
Age: 25
End: 2025-07-02

## 2025-07-11 ENCOUNTER — NON-APPOINTMENT (OUTPATIENT)
Age: 25
End: 2025-07-11

## 2025-07-11 ENCOUNTER — LABORATORY RESULT (OUTPATIENT)
Age: 25
End: 2025-07-11

## 2025-07-11 ENCOUNTER — APPOINTMENT (OUTPATIENT)
Dept: INTERNAL MEDICINE | Facility: CLINIC | Age: 25
End: 2025-07-11
Payer: MEDICAID

## 2025-07-11 ENCOUNTER — APPOINTMENT (OUTPATIENT)
Dept: INTERNAL MEDICINE | Facility: CLINIC | Age: 25
End: 2025-07-11

## 2025-07-11 VITALS
WEIGHT: 164 LBS | HEART RATE: 82 BPM | SYSTOLIC BLOOD PRESSURE: 102 MMHG | BODY MASS INDEX: 30.18 KG/M2 | HEIGHT: 62 IN | DIASTOLIC BLOOD PRESSURE: 70 MMHG | OXYGEN SATURATION: 98 %

## 2025-07-11 PROBLEM — Z11.1 SCREENING EXAMINATION FOR PULMONARY TUBERCULOSIS: Status: ACTIVE | Noted: 2025-07-11

## 2025-07-11 PROCEDURE — 99395 PREV VISIT EST AGE 18-39: CPT | Mod: 25

## 2025-07-16 ENCOUNTER — APPOINTMENT (OUTPATIENT)
Dept: INTERNAL MEDICINE | Facility: CLINIC | Age: 25
End: 2025-07-16
Payer: MEDICAID

## 2025-07-16 VITALS
DIASTOLIC BLOOD PRESSURE: 80 MMHG | HEIGHT: 62 IN | OXYGEN SATURATION: 97 % | BODY MASS INDEX: 30.55 KG/M2 | SYSTOLIC BLOOD PRESSURE: 120 MMHG | WEIGHT: 166 LBS | HEART RATE: 75 BPM

## 2025-07-16 PROBLEM — Z86.79 HISTORY OF HYPERTENSION: Status: RESOLVED | Noted: 2022-07-20 | Resolved: 2025-07-16

## 2025-07-16 PROBLEM — R35.0 INCREASED URINARY FREQUENCY: Status: ACTIVE | Noted: 2025-07-16

## 2025-07-16 LAB
25(OH)D3 SERPL-MCNC: 26.5 NG/ML
ALBUMIN SERPL ELPH-MCNC: 4.5 G/DL
ALP BLD-CCNC: 105 U/L
ALT SERPL-CCNC: 11 U/L
ANION GAP SERPL CALC-SCNC: 12 MMOL/L
APPEARANCE: CLEAR
APPEARANCE: CLEAR
AST SERPL-CCNC: 18 U/L
BACTERIA: ABNORMAL /HPF
BACTERIA: NEGATIVE /HPF
BASOPHILS # BLD AUTO: 0 K/UL
BASOPHILS NFR BLD AUTO: 0 %
BILIRUB SERPL-MCNC: 0.3 MG/DL
BILIRUB UR QL STRIP: NEGATIVE
BILIRUBIN URINE: NEGATIVE
BILIRUBIN URINE: NEGATIVE
BLOOD URINE: NEGATIVE
BLOOD URINE: NEGATIVE
BUN SERPL-MCNC: 10 MG/DL
C TRACH RRNA SPEC QL NAA+PROBE: NOT DETECTED
CALCIUM SERPL-MCNC: 9.4 MG/DL
CAST: 0 /LPF
CAST: 0 /LPF
CHLORIDE SERPL-SCNC: 103 MMOL/L
CHOLEST SERPL-MCNC: 131 MG/DL
CLARITY UR: CLEAR
CO2 SERPL-SCNC: 24 MMOL/L
COLLECTION METHOD: NORMAL
COLOR: YELLOW
COLOR: YELLOW
CREAT SERPL-MCNC: 0.93 MG/DL
EGFRCR SERPLBLD CKD-EPI 2021: 88 ML/MIN/1.73M2
EOSINOPHIL # BLD AUTO: 0 K/UL
EOSINOPHIL NFR BLD AUTO: 0 %
EPITHELIAL CELLS: 1 /HPF
EPITHELIAL CELLS: 4 /HPF
ESTIMATED AVERAGE GLUCOSE: 100 MG/DL
FERRITIN SERPL-MCNC: 45 NG/ML
GLUCOSE QUALITATIVE U: NEGATIVE MG/DL
GLUCOSE QUALITATIVE U: NEGATIVE MG/DL
GLUCOSE SERPL-MCNC: 81 MG/DL
GLUCOSE UR-MCNC: NEGATIVE
HBA1C MFR BLD HPLC: 5.1 %
HBV SURFACE AB SER QL: NONREACTIVE
HBV SURFACE AG SER QL: NONREACTIVE
HCG UR QL: 0.2 EU/DL
HCT VFR BLD CALC: 36.2 %
HCV AB SER QL: NONREACTIVE
HCV S/CO RATIO: 0.2 S/CO
HDLC SERPL-MCNC: 53 MG/DL
HGB BLD-MCNC: 11.8 G/DL
HGB UR QL STRIP.AUTO: NEGATIVE
HIV1+2 AB SPEC QL IA.RAPID: NONREACTIVE
IMM GRANULOCYTES NFR BLD AUTO: 0.4 %
IRON SATN MFR SERPL: 28 %
IRON SERPL-MCNC: 86 UG/DL
KETONES UR-MCNC: NEGATIVE
KETONES URINE: ABNORMAL MG/DL
KETONES URINE: NEGATIVE MG/DL
LDLC SERPL-MCNC: 68 MG/DL
LEUKOCYTE ESTERASE UR QL STRIP: NEGATIVE
LEUKOCYTE ESTERASE URINE: NEGATIVE
LEUKOCYTE ESTERASE URINE: NEGATIVE
LYMPHOCYTES # BLD AUTO: 1.55 K/UL
LYMPHOCYTES NFR BLD AUTO: 22.6 %
M TB IFN-G BLD-IMP: NEGATIVE
MAN DIFF?: NORMAL
MCHC RBC-ENTMCNC: 21.8 PG
MCHC RBC-ENTMCNC: 32.6 G/DL
MCV RBC AUTO: 66.9 FL
MICROSCOPIC-UA: NORMAL
MICROSCOPIC-UA: NORMAL
MONOCYTES # BLD AUTO: 0.39 K/UL
MONOCYTES NFR BLD AUTO: 5.7 %
N GONORRHOEA RRNA SPEC QL NAA+PROBE: NOT DETECTED
NEUTROPHILS # BLD AUTO: 4.9 K/UL
NEUTROPHILS NFR BLD AUTO: 71.3 %
NITRITE UR QL STRIP: NEGATIVE
NITRITE URINE: NEGATIVE
NITRITE URINE: NEGATIVE
NONHDLC SERPL-MCNC: 78 MG/DL
PH UR STRIP: 8
PH URINE: 5.5
PH URINE: 8
PLATELET # BLD AUTO: 295 K/UL
POTASSIUM SERPL-SCNC: 3.9 MMOL/L
PROT SERPL-MCNC: 8 G/DL
PROT UR STRIP-MCNC: NORMAL
PROTEIN URINE: NEGATIVE MG/DL
PROTEIN URINE: NEGATIVE MG/DL
QUANTIFERON TB PLUS MITOGEN MINUS NIL: >10 IU/ML
QUANTIFERON TB PLUS NIL: 0.03 IU/ML
QUANTIFERON TB PLUS TB1 MINUS NIL: 0 IU/ML
QUANTIFERON TB PLUS TB2 MINUS NIL: 0 IU/ML
RBC # BLD: 5.41 M/UL
RBC # FLD: 14 %
RED BLOOD CELLS URINE: 0 /HPF
RED BLOOD CELLS URINE: 1 /HPF
SODIUM SERPL-SCNC: 138 MMOL/L
SOURCE AMPLIFICATION: NORMAL
SP GR UR STRIP: 1.01
SPECIFIC GRAVITY URINE: 1.02
SPECIFIC GRAVITY URINE: 1.02
T PALLIDUM AB SER QL IA: NEGATIVE
TIBC SERPL-MCNC: 306 UG/DL
TRIGL SERPL-MCNC: 40 MG/DL
TSH SERPL-ACNC: 0.5 UIU/ML
UIBC SERPL-MCNC: 219 UG/DL
UROBILINOGEN URINE: 0.2 MG/DL
UROBILINOGEN URINE: 0.2 MG/DL
WBC # FLD AUTO: 6.87 K/UL
WHITE BLOOD CELLS URINE: 0 /HPF
WHITE BLOOD CELLS URINE: 0 /HPF

## 2025-07-16 PROCEDURE — 99214 OFFICE O/P EST MOD 30 MIN: CPT | Mod: 25

## 2025-07-16 PROCEDURE — 81003 URINALYSIS AUTO W/O SCOPE: CPT | Mod: QW

## 2025-07-17 LAB — BACTERIA UR CULT: NORMAL

## 2025-07-28 ENCOUNTER — NON-APPOINTMENT (OUTPATIENT)
Age: 25
End: 2025-07-28

## 2025-08-11 ENCOUNTER — TRANSCRIPTION ENCOUNTER (OUTPATIENT)
Age: 25
End: 2025-08-11

## 2025-08-12 ENCOUNTER — TRANSCRIPTION ENCOUNTER (OUTPATIENT)
Age: 25
End: 2025-08-12

## 2025-09-09 ENCOUNTER — NON-APPOINTMENT (OUTPATIENT)
Age: 25
End: 2025-09-09